# Patient Record
Sex: FEMALE | Race: OTHER | Employment: UNEMPLOYED | URBAN - METROPOLITAN AREA
[De-identification: names, ages, dates, MRNs, and addresses within clinical notes are randomized per-mention and may not be internally consistent; named-entity substitution may affect disease eponyms.]

---
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
## 2020-01-12 ENCOUNTER — APPOINTMENT (OUTPATIENT)
Dept: ULTRASOUND IMAGING | Age: 20
End: 2020-01-12
Attending: NURSE PRACTITIONER
Payer: SELF-PAY

## 2020-01-12 ENCOUNTER — HOSPITAL ENCOUNTER (EMERGENCY)
Age: 20
Discharge: HOME | End: 2020-01-12
Attending: EMERGENCY MEDICINE
Payer: SELF-PAY

## 2020-01-12 VITALS
OXYGEN SATURATION: 99 % | SYSTOLIC BLOOD PRESSURE: 119 MMHG | RESPIRATION RATE: 19 BRPM | TEMPERATURE: 98 F | WEIGHT: 150 LBS | HEIGHT: 60 IN | DIASTOLIC BLOOD PRESSURE: 82 MMHG | BODY MASS INDEX: 29.45 KG/M2 | HEART RATE: 88 BPM

## 2020-01-12 DIAGNOSIS — N39.0 URINARY TRACT INFECTION WITH HEMATURIA, SITE UNSPECIFIED: Primary | ICD-10-CM

## 2020-01-12 DIAGNOSIS — R31.9 URINARY TRACT INFECTION WITH HEMATURIA, SITE UNSPECIFIED: Primary | ICD-10-CM

## 2020-01-12 DIAGNOSIS — Z32.01 PREGNANCY TEST PERFORMED, PREGNANCY CONFIRMED: ICD-10-CM

## 2020-01-12 LAB
ALBUMIN SERPL-MCNC: 3.3 G/DL (ref 3.5–5)
ALBUMIN/GLOB SERPL: 0.8 {RATIO} (ref 1.1–2.2)
ALP SERPL-CCNC: 58 U/L (ref 45–117)
ALT SERPL-CCNC: 13 U/L (ref 12–78)
ANION GAP SERPL CALC-SCNC: 4 MMOL/L (ref 5–15)
APPEARANCE UR: ABNORMAL
AST SERPL-CCNC: 10 U/L (ref 15–37)
BACTERIA URNS QL MICRO: ABNORMAL /HPF
BASOPHILS # BLD: 0 K/UL (ref 0–0.1)
BASOPHILS NFR BLD: 0 % (ref 0–1)
BILIRUB SERPL-MCNC: 0.4 MG/DL (ref 0.2–1)
BILIRUB UR QL: NEGATIVE
BUN SERPL-MCNC: 5 MG/DL (ref 6–20)
BUN/CREAT SERPL: 10 (ref 12–20)
CALCIUM SERPL-MCNC: 8.8 MG/DL (ref 8.5–10.1)
CHLORIDE SERPL-SCNC: 110 MMOL/L (ref 97–108)
CO2 SERPL-SCNC: 25 MMOL/L (ref 21–32)
COLOR UR: ABNORMAL
COMMENT, HOLDF: NORMAL
CREAT SERPL-MCNC: 0.5 MG/DL (ref 0.55–1.02)
DIFFERENTIAL METHOD BLD: NORMAL
EOSINOPHIL # BLD: 0.1 K/UL (ref 0–0.4)
EOSINOPHIL NFR BLD: 1 % (ref 0–7)
EPITH CASTS URNS QL MICRO: ABNORMAL /LPF
ERYTHROCYTE [DISTWIDTH] IN BLOOD BY AUTOMATED COUNT: 12.5 % (ref 11.5–14.5)
GLOBULIN SER CALC-MCNC: 4.4 G/DL (ref 2–4)
GLUCOSE SERPL-MCNC: 80 MG/DL (ref 65–100)
GLUCOSE UR STRIP.AUTO-MCNC: NEGATIVE MG/DL
HCG SERPL-ACNC: ABNORMAL MIU/ML (ref 0–6)
HCT VFR BLD AUTO: 37.8 % (ref 35–47)
HGB BLD-MCNC: 12.8 G/DL (ref 11.5–16)
HGB UR QL STRIP: ABNORMAL
IMM GRANULOCYTES # BLD AUTO: 0 K/UL (ref 0–0.04)
IMM GRANULOCYTES NFR BLD AUTO: 0 % (ref 0–0.5)
KETONES UR QL STRIP.AUTO: 40 MG/DL
LEUKOCYTE ESTERASE UR QL STRIP.AUTO: ABNORMAL
LIPASE SERPL-CCNC: 109 U/L (ref 73–393)
LYMPHOCYTES # BLD: 2 K/UL (ref 0.8–3.5)
LYMPHOCYTES NFR BLD: 22 % (ref 12–49)
MCH RBC QN AUTO: 30 PG (ref 26–34)
MCHC RBC AUTO-ENTMCNC: 33.9 G/DL (ref 30–36.5)
MCV RBC AUTO: 88.5 FL (ref 80–99)
MONOCYTES # BLD: 0.5 K/UL (ref 0–1)
MONOCYTES NFR BLD: 5 % (ref 5–13)
NEUTS SEG # BLD: 6.7 K/UL (ref 1.8–8)
NEUTS SEG NFR BLD: 72 % (ref 32–75)
NITRITE UR QL STRIP.AUTO: NEGATIVE
NRBC # BLD: 0 K/UL (ref 0–0.01)
NRBC BLD-RTO: 0 PER 100 WBC
PH UR STRIP: 6 [PH] (ref 5–8)
PLATELET # BLD AUTO: 209 K/UL (ref 150–400)
PMV BLD AUTO: 11.3 FL (ref 8.9–12.9)
POTASSIUM SERPL-SCNC: 3.6 MMOL/L (ref 3.5–5.1)
PROT SERPL-MCNC: 7.7 G/DL (ref 6.4–8.2)
PROT UR STRIP-MCNC: NEGATIVE MG/DL
RBC # BLD AUTO: 4.27 M/UL (ref 3.8–5.2)
RBC #/AREA URNS HPF: ABNORMAL /HPF (ref 0–5)
SAMPLES BEING HELD,HOLD: NORMAL
SODIUM SERPL-SCNC: 139 MMOL/L (ref 136–145)
SP GR UR REFRACTOMETRY: 1.02 (ref 1–1.03)
UR CULT HOLD, URHOLD: NORMAL
UROBILINOGEN UR QL STRIP.AUTO: 0.2 EU/DL (ref 0.2–1)
WBC # BLD AUTO: 9.4 K/UL (ref 3.6–11)
WBC URNS QL MICRO: ABNORMAL /HPF (ref 0–4)

## 2020-01-12 PROCEDURE — 81001 URINALYSIS AUTO W/SCOPE: CPT

## 2020-01-12 PROCEDURE — 76815 OB US LIMITED FETUS(S): CPT

## 2020-01-12 PROCEDURE — 99282 EMERGENCY DEPT VISIT SF MDM: CPT

## 2020-01-12 PROCEDURE — 86900 BLOOD TYPING SEROLOGIC ABO: CPT

## 2020-01-12 PROCEDURE — 84702 CHORIONIC GONADOTROPIN TEST: CPT

## 2020-01-12 PROCEDURE — 85025 COMPLETE CBC W/AUTO DIFF WBC: CPT

## 2020-01-12 PROCEDURE — 36415 COLL VENOUS BLD VENIPUNCTURE: CPT

## 2020-01-12 PROCEDURE — 83690 ASSAY OF LIPASE: CPT

## 2020-01-12 PROCEDURE — 80053 COMPREHEN METABOLIC PANEL: CPT

## 2020-01-12 RX ORDER — CEPHALEXIN 500 MG/1
500 CAPSULE ORAL 4 TIMES DAILY
Qty: 28 CAP | Refills: 0 | Status: SHIPPED | OUTPATIENT
Start: 2020-01-12 | End: 2020-01-19

## 2020-01-12 NOTE — ED TRIAGE NOTES
Patient reports being 12 week appt states she will see OB on 1/21. Patient reports strong cramps at night with thick yellow discharge.

## 2020-01-13 LAB
ABO + RH BLD: NORMAL
BLOOD BANK CMNT PATIENT-IMP: NORMAL

## 2020-01-13 NOTE — DISCHARGE INSTRUCTIONS
Patient Education        Urinary Tract Infection in Pregnancy: Care Instructions  Your Care Instructions    A urinary tract infection, or UTI, is an infection of the bladder and other urinary structures. Most UTIs occur in the bladder. They often cause pain or burning when you urinate. UTI is the most common bacterial infection in pregnancy. If untreated, a UTI could lead to problems such as a kidney infection or  labor. Most UTIs can be cured with antibiotics. Your doctor will prescribe an antibiotic that is safe during pregnancy. Be sure to finish your medicine so that the infection does not spread to your kidneys. Follow-up care is a key part of your treatment and safety. Be sure to make and go to all appointments, and call your doctor if you are having problems. It's also a good idea to know your test results and keep a list of the medicines you take. How can you care for yourself at home? · Take your antibiotics as directed. Do not stop taking them just because you feel better. You need to take the full course of antibiotics. · Drink extra water and other fluids for the next day or two. This will help wash out the bacteria causing the infection. If you have kidney, heart, or liver disease and have to limit fluids, talk with your doctor before you increase the amount of fluids you drink. · Do not drink alcohol. · Urinate often. Try to empty your bladder each time. Preventing UTIs  · Drink plenty of fluids, enough so that your urine is light yellow or clear like water. This helps you urinate often, which clears bacteria from your system. If you have kidney, heart, or liver disease and have to limit fluids, talk with your doctor before you increase the amount of fluids you drink. · Urinate when you first have the urge. · Urinate right after you have sex. This is the best way for women to avoid UTIs.   · When going to the bathroom, wipe from front to back to keep bacteria from entering the vagina or urethra. When should you call for help? Call your doctor now or seek immediate medical care if:    · You have symptoms of a worse urinary tract infection. These may include:  ? Pain or burning when you urinate. ? A frequent need to urinate without being able to pass much urine. ? Pain in the flank, which is just below the rib cage and above the waist on either side of the back. ? Blood in your urine. ? A fever.    Watch closely for changes in your health, and be sure to contact your doctor if:    · You do not get better as expected. Where can you learn more? Go to http://choco-rena.info/. Enter M982 in the search box to learn more about \"Urinary Tract Infection in Pregnancy: Care Instructions. \"  Current as of: May 29, 2019  Content Version: 12.2  © 8543-6145 [employer]. Care instructions adapted under license by [employer] (which disclaims liability or warranty for this information). If you have questions about a medical condition or this instruction, always ask your healthcare professional. [address] any warranty or liability for your use of this information.

## 2020-01-22 ENCOUNTER — HOSPITAL ENCOUNTER (OUTPATIENT)
Dept: LAB | Age: 20
Discharge: HOME | End: 2020-01-22

## 2020-01-22 ENCOUNTER — HOSPITAL ENCOUNTER (OUTPATIENT)
Dept: LAB | Age: 20
Discharge: HOME | End: 2020-01-22
Payer: SELF-PAY

## 2020-01-22 ENCOUNTER — INITIAL PRENATAL (OUTPATIENT)
Dept: FAMILY MEDICINE CLINIC | Age: 20
End: 2020-01-22

## 2020-01-22 VITALS
HEIGHT: 60 IN | TEMPERATURE: 97.5 F | WEIGHT: 132.8 LBS | DIASTOLIC BLOOD PRESSURE: 77 MMHG | BODY MASS INDEX: 26.07 KG/M2 | HEART RATE: 99 BPM | OXYGEN SATURATION: 99 % | RESPIRATION RATE: 16 BRPM | SYSTOLIC BLOOD PRESSURE: 117 MMHG

## 2020-01-22 DIAGNOSIS — Z34.80 SUPERVISION OF OTHER NORMAL PREGNANCY: ICD-10-CM

## 2020-01-22 DIAGNOSIS — Z34.80 SUPERVISION OF OTHER NORMAL PREGNANCY: Primary | ICD-10-CM

## 2020-01-22 LAB
BILIRUB UR QL STRIP: NEGATIVE
GLUCOSE UR-MCNC: NEGATIVE MG/DL
KETONES P FAST UR STRIP-MCNC: NEGATIVE MG/DL
PH UR STRIP: 6.5 [PH] (ref 4.6–8)
PROT UR QL STRIP: NORMAL
SP GR UR STRIP: 1.02 (ref 1–1.03)
UA UROBILINOGEN AMB POC: NORMAL (ref 0.2–1)
URINALYSIS CLARITY POC: CLEAR
URINALYSIS COLOR POC: YELLOW
URINE BLOOD POC: NORMAL
URINE LEUKOCYTES POC: NORMAL
URINE NITRITES POC: NEGATIVE

## 2020-01-22 PROCEDURE — 88175 CYTOPATH C/V AUTO FLUID REDO: CPT

## 2020-01-22 RX ORDER — GUAIFENESIN 100 MG/5ML
81 LIQUID (ML) ORAL DAILY
Qty: 90 TAB | Refills: 2 | Status: SHIPPED | OUTPATIENT
Start: 2020-01-22 | End: 2020-06-21

## 2020-01-22 NOTE — PROGRESS NOTES
RETURN OB VISIT     Subjective:   Tamia Thompson is a  who presents at 15w0d for her initial prenatal visit. LMP ~ 10/1/2019. Had US to confirm TWIN pregnancy at [address] on 2019 and JAZMINE 7/15/20. US was 8 days discrepant from LMP dating    She developed fever, headache, body aches and cough x 3-4 days ago, but feeling better now. Her son was + for flu last week with similar sxs. Just lingering cough. Declines tamiflu tx. Was treated for UTI earlier this pregnancy after visiting ER on 20. She has completed the abx. Denies dysuria or other urinary sxs. Still with some occasional nausea. Not bad now, easing off. Declines antiemetics, B6. She is eating a balanced diet. Drinking water. She is taking her prenatal vitamins. She is not yet feeling baby movement. She denies vaginal bleeding, discharge or loss of fluid. She denies nausea, vomiting, severe abdominal pain or cramping. She denies dysuria. Current Outpatient Medications on File Prior to Visit   Medication Sig Dispense Refill    PNV no.24-iron-folic acid-dha (PRENATAL DHA+COMPLETE PRENATAL) -300 mg-mcg-mg cmpk Take 1 Tab by mouth daily.  ibuprofen (MOTRIN) 800 mg tablet Take 1 Tab by mouth every eight (8) hours. 30 Tab 0     No current facility-administered medications on file prior to visit. Objective:     Visit Vitals  /77   Pulse 99   Temp 97.5 °F (36.4 °C) (Oral)   Resp 16   Ht 5' (1.524 m)   Wt 132 lb 12.8 oz (60.2 kg)   SpO2 99%   BMI 25.94 kg/m²     Physical Exam:  GENERAL APPEARANCE: alert, well appearing, in no apparent distress, well hydrated  HEAD: normocephalic, atraumatic  LUNGS: clear to auscultation, no wheezes, rales or rhonchi, symmetric air entry  HEART: regular rate and rhythm, no murmurs  ABDOMEN: soft, nontender, nondistended, no abnormal masses, no epigastric pain  Fundus soft, nontender.  FHT present for Baby A/R (153) and Baby B/L (148)  : Normal appearing vulva and vagina, mod white clumpy vaginal discharge, no cervical changes or discharge, no CMT    Results for orders placed or performed in visit on 20   AMB POC URINALYSIS DIP STICK AUTO W/O MICRO     Status: None   Result Value Ref Range Status    Color (UA POC) Yellow  Final    Clarity (UA POC) Clear  Final    Glucose (UA POC) Negative Negative Final    Bilirubin (UA POC) Negative Negative Final    Ketones (UA POC) Negative Negative Final    Specific gravity (UA POC) 1.025 1.001 - 1.035 Final    Blood (UA POC) Trace Negative Final    pH (UA POC) 6.5 4.6 - 8.0 Final    Protein (UA POC) 1+ Negative Final    Urobilinogen (UA POC) 1 mg/dL 0.2 - 1 Final    Nitrites (UA POC) Negative Negative Final    Leukocyte esterase (UA POC) 1+ Negative Final   Results for orders placed or performed in visit on 17   AMB POC URINALYSIS DIP STICK AUTO W/ MICRO     Status: None   Result Value Ref Range Status    Color (UA POC) Yellow  Final    Clarity (UA POC) Slightly Cloudy  Final    Glucose (UA POC) Negative Negative Final    Bilirubin (UA POC) Negative Negative Final    Ketones (UA POC) 2+ Negative Final    Specific gravity (UA POC) 1.030 1.001 - 1.035 Final    Blood (UA POC) Trace Negative Final    pH (UA POC) 6.0 4.6 - 8.0 Final    Protein (UA POC) Trace Negative mg/dL Final    Urobilinogen (UA POC) normal 0.2 - 1 Final    Nitrites (UA POC) Negative Negative Final    Leukocyte esterase (UA POC) Trace Negative Final       Assessment/ Plan:   Davi Israel is a 22 y.o.  at 15w0d by LMP c/w 6w6d US with Estimated Date of Delivery: 7/15/20. Complicated by: Di/Di Twin IUP  Blood type O POSITIVE  PNLs ordered today, lab closed so pt will return for these  First Pap smear collected today  Information re: genetic screening given, pt to discuss with FOB  Request for MFM anatomy scan faxed today  H/o UTI s/p tx. Repeat UCx today, no dysuria  Pt believes she recently had influenza.  She declines flu vaccine today, may accept at subsequent visit. Declines tamiflu tx as she is feeling better. 1. Di/di Twim IUP: Both babies visualized on bedside US today, + cardiac activity and normal fetal movement. Start ASA 81mg daily for preE ppx. Diagnoses and all orders for this visit:    1. Supervision of other normal pregnancy  -     AMB POC URINALYSIS DIP STICK AUTO W/O MICRO  -     BLOOD TYPE, (ABO+RH); Future  -     ANTIBODY SCREEN; Future  -     HEP B SURFACE AG; Future  -     HEMOGLOBIN FRACTIONATION; Future  -     HIV 1/2 AG/AB, 4TH GENERATION,W RFLX CONFIRM; Future  -     RUBELLA AB, IGG; Future  -     VZV AB, IGG; Future  -     CHLAMYDIA/GC PCR; Future  -     CBC WITH AUTOMATED DIFF; Future  -     CULTURE, URINE; Future  -     PAP IG, RFX APTIMA HPV ASCUS ([id number])); Future  -     T PALLIDUM AB, BY FTA-ABS; Future  -     AMB POC SMEAR, STAIN & INTERPRET, WET MOUNT    Other orders  -     prenatal vit-calcium-iron-fa (PRENATAL PLUS WITH CALCIUM) 27 mg iron- 1 mg tab; Take 1 Tab by mouth daily. -     aspirin 81 mg chewable tablet; Take 1 Tab by mouth daily. For preeclampsia prevention         Follow-up and Dispositions    · Return in about 4 weeks (around 2/19/2020) for Routine PNC. I have discussed the diagnosis with the patient and the intended plan as seen in the above orders. Medication Side Effects and Warnings were discussed with patient: yes  Patient Labs were reviewed: yes  Patient Past Records were reviewed:  yes    AVS was printed, given to patient and briefly discussed prior to patient's departure from the office today.     Susanne Pemberton MD  [address]   [address] [facility] Dionisio Leone  [phone]

## 2020-01-22 NOTE — PROGRESS NOTES
Chief Complaint   Patient presents with    Initial Prenatal Visit     Blood pressure 117/77, pulse 99, temperature 97.5 °F (36.4 °C), temperature source Oral, resp. rate 16, height 5' (1.524 m), weight 132 lb 12.8 oz (60.2 kg), SpO2 99 %, unknown if currently breastfeeding. 1. Have you been to the ER, urgent care clinic since your last visit? Hospitalized since your last visit? No    2. Have you seen or consulted any other health care providers outside of the [address] [place] [address] since your last visit? Include any pap smears or colon screening. No      Patient denies any bleeding,cramping or leakage of fluid. + fetal movement.

## 2020-01-22 NOTE — PATIENT INSTRUCTIONS
Weeks 14 to 18 of Your Pregnancy: Care Instructions  Your Care Instructions    During this time, you may start to \"show,\" so that you look pregnant to people around you. You may also notice some changes in your skin, such as itchy spots on your palms or acne on your face. Your baby is now able to pass urine, and your baby's first stool (meconium) is starting to collect in his or her intestines. Hair is also beginning to grow on your baby's head. At your next visit, between weeks 18 and 20, your doctor may do an ultrasound test. The test allows your doctor to check for certain problems. Your doctor can also tell the sex of your baby. This is a good time to think about whether you want to know whether your baby is a boy or a girl. Talk to your doctor about getting a flu shot to help keep you healthy during your pregnancy. As your pregnancy moves along, it is common to worry or feel anxious. Your body is changing a lot. And you are thinking about giving birth, the health of your baby, and becoming a parent. You can learn to cope with any anxiety and stress you feel. Follow-up care is a key part of your treatment and safety. Be sure to make and go to all appointments, and call your doctor if you are having problems. It's also a good idea to know your test results and keep a list of the medicines you take. How can you care for yourself at home?   Reduce stress    · Ask for help with cooking and housekeeping.     · Figure out who or what causes your stress. Avoid these people or situations as much as possible.     · Relax every day. Taking 10- to 15-minute breaks can make a big difference. Take a walk, listen to music, or take a warm bath.     · Learn relaxation techniques at prenatal or yoga class. Or buy a relaxation tape.     · List your fears about having a baby and becoming a parent. Share the list with someone you trust. Decide which worries are really small, and try to let them go.    Exercise    · If you did not exercise much before pregnancy, start slowly. Walking is best. [employer], and do a little more every day.     · Brisk walking, easy jogging, low-impact aerobics, water aerobics, and yoga are good choices. Some sports, such as scuba diving, horseback riding, downhill skiing, gymnastics, and water skiing, are not a good idea.     · Try to do at least 2½ hours a week of moderate exercise, such as a fast walk. One way to do this is to be active 30 minutes a day, at least 5 days a week. It's fine to be active in blocks of 10 minutes or more throughout your day and week.     · Wear loose clothing. And wear shoes and a bra that provide good support.     · Warm up and cool down to start and finish your exercise.     · If you want to use weights, be sure to use light weights. They reduce stress on your joints.    Stay at the best weight for you    · Experts recommend that you gain about 1 pound a month during the first 3 months of your pregnancy.     · Experts recommend that you gain about 1 pound a week during your last 6 months of pregnancy, for a total weight gain of 25 to 35 pounds.     · If you are underweight, you will need to gain more weight (about 28 to 40 pounds).     · If you are overweight, you may not need to gain as much weight (about 15 to 25 pounds).     · If you are gaining weight too fast, use common sense. Exercise every day, and limit sweets, fast foods, and fats. Choose lean meats, fruits, and vegetables.     · If you are having twins or more, your doctor may refer you to a dietitian. Where can you learn more? Go to http://choco-rena.info/. Enter A608 in the search box to learn more about \"Weeks 14 to 18 of Your Pregnancy: Care Instructions. \"  Current as of: May 29, 2019  Content Version: 12.2  © 0409-3266 [employer]. Care instructions adapted under license by [employer] (which disclaims liability or warranty for this information).  If you have questions about a medical condition or this instruction, always ask your healthcare professional. [address] any warranty or liability for your use of this information. Learning About When to Call Your Doctor During Pregnancy (Up to 20 Weeks)  Your Care Instructions    It's common to have concerns about what might be a problem during pregnancy. Although most pregnant women don't have any serious problems, it's important to know when to call your doctor if you have certain symptoms. These are general suggestions. Your doctor may give you some more information about when to call. When to call your doctor (up to 20 weeks)  Call 911 anytime you think you may need emergency care. For example, call if:  · You passed out (lost consciousness). Call your doctor now or seek immediate medical care if:  · You have a fever. · You have vaginal bleeding. · You are dizzy or lightheaded, or you feel like you may faint. · You have symptoms of a urinary tract infection. These may include:  ? Pain or burning when you urinate. ? A frequent need to urinate without being able to pass much urine. ? Pain in the flank, which is just below the rib cage and above the waist on either side of the back. ? Blood in your urine. · You have belly pain. · You think you are having contractions. · You have a sudden release of fluid from your vagina. Watch closely for changes in your health, and be sure to contact your doctor if:  · You have vaginal discharge that smells bad. · You have other concerns about your pregnancy. Follow-up care is a key part of your treatment and safety. Be sure to make and go to all appointments, and call your doctor if you are having problems. It's also a good idea to know your test results and keep a list of the medicines you take. Where can you learn more? Go to http://choco-rena.info/.   Enter C591 in the search box to learn more about \"Learning About When to Call Your Doctor During Pregnancy (Up to 20 Weeks). \"  Current as of: May 29, 2019  Content Version: 12.2  © 5564-9540 [employer]. Care instructions adapted under license by [employer] (which disclaims liability or warranty for this information). If you have questions about a medical condition or this instruction, always ask your healthcare professional. [address] any warranty or liability for your use of this information.

## 2020-01-24 ENCOUNTER — TELEPHONE (OUTPATIENT)
Dept: FAMILY MEDICINE CLINIC | Age: 20
End: 2020-01-24

## 2020-01-24 LAB
BACTERIA SPEC CULT: NORMAL
C TRACH DNA SPEC QL NAA+PROBE: NEGATIVE
N GONORRHOEA DNA SPEC QL NAA+PROBE: NEGATIVE
SAMPLE TYPE: NORMAL
SERVICE CMNT-IMP: NORMAL
SERVICE CMNT-IMP: NORMAL
SPECIMEN SOURCE: NORMAL
WET MOUNT POCT, WMPOCT: NORMAL

## 2020-01-24 NOTE — TELEPHONE ENCOUNTER
Attempted to call patient to notify of lab results per Dr. Katy Garvin. Left voicemail for patient to return call.

## 2020-01-24 NOTE — PROGRESS NOTES
I attempted to reach pt - no answer at only # listed, left general vm asking pt to call us back to discuss results  Routing to nurse to also try to reach pt and relay results/recs below:  - Pap smear normal cytology, next due in 3 years  - GC/Chlam neg  - UCx neg for UTI  - Wet mount/KOH did show scant budding yeast, no clue cells or trich.   ** If she is having vaginal discomfort/itching then would tx with clotrimazole vaginal suppositories - nurse to let me know if pt would like and I can send in rx, thank you

## 2020-02-18 ENCOUNTER — HOSPITAL ENCOUNTER (OUTPATIENT)
Dept: LAB | Age: 20
Discharge: HOME | End: 2020-02-18

## 2020-02-18 ENCOUNTER — ROUTINE PRENATAL (OUTPATIENT)
Dept: FAMILY MEDICINE CLINIC | Age: 20
End: 2020-02-18

## 2020-02-18 VITALS
RESPIRATION RATE: 16 BRPM | HEIGHT: 60 IN | SYSTOLIC BLOOD PRESSURE: 116 MMHG | BODY MASS INDEX: 27.09 KG/M2 | OXYGEN SATURATION: 98 % | TEMPERATURE: 98.2 F | HEART RATE: 99 BPM | DIASTOLIC BLOOD PRESSURE: 79 MMHG | WEIGHT: 138 LBS

## 2020-02-18 DIAGNOSIS — Z34.80 SUPERVISION OF OTHER NORMAL PREGNANCY: ICD-10-CM

## 2020-02-18 DIAGNOSIS — O30.042 DICHORIONIC DIAMNIOTIC TWIN PREGNANCY IN SECOND TRIMESTER: ICD-10-CM

## 2020-02-18 DIAGNOSIS — O09.92 PREGNANCY, SUPERVISION, HIGH-RISK, SECOND TRIMESTER: ICD-10-CM

## 2020-02-18 DIAGNOSIS — O09.92 PREGNANCY, SUPERVISION, HIGH-RISK, SECOND TRIMESTER: Primary | ICD-10-CM

## 2020-02-18 LAB
ANTIBODY SCREEN, EXTERNAL: NEGATIVE
BILIRUB UR QL STRIP: NEGATIVE
CHLAMYDIA, EXTERNAL: NEGATIVE
GLUCOSE UR-MCNC: NEGATIVE MG/DL
HBSAG, EXTERNAL: NEGATIVE
HIV, EXTERNAL: NEGATIVE
KETONES P FAST UR STRIP-MCNC: NORMAL MG/DL
N. GONORRHEA, EXTERNAL: NEGATIVE
PH UR STRIP: 7 [PH] (ref 4.6–8)
PROT UR QL STRIP: NORMAL
RUBELLA, EXTERNAL: NORMAL
SP GR UR STRIP: 1.02 (ref 1–1.03)
T. PALLIDUM, EXTERNAL: NORMAL
TYPE, ABO & RH, EXTERNAL: NORMAL
UA UROBILINOGEN AMB POC: NORMAL (ref 0.2–1)
URINALYSIS CLARITY POC: NORMAL
URINALYSIS COLOR POC: YELLOW
URINE BLOOD POC: NORMAL
URINE LEUKOCYTES POC: NORMAL
URINE NITRITES POC: NEGATIVE

## 2020-02-18 NOTE — PROGRESS NOTES
Chief Complaint   Patient presents with    Routine Prenatal Visit     18w6d    Leakage of Fluid: NO  Vaginal Bleeding: NO  Fetal Movement: YES  Prenatal vitamins: YES  Having Contractions: NO  Pain: NO    Visit Vitals  /79 (BP 1 Location: Left arm, BP Patient Position: Sitting)   Pulse 99   Temp 98.2 °F (36.8 °C) (Oral)   Resp 16   Ht 5' (1.524 m)   Wt 138 lb (62.6 kg)   SpO2 98%   BMI 26.95 kg/m²

## 2020-02-18 NOTE — PROGRESS NOTES
RETURN OB VISIT     Subjective:   Trista Perez is a  who presents at 18w6d for routine prenatal visit. She denies complaints    She is taking her prenatal vitamins. Has not yet started aspirin, wanted to talk to me again today. She is feeling her babies move a little  She denies vaginal bleeding, discharge or loss of fluid. She denies nausea, vomiting, severe abdominal pain or cramping. She denies dysuria. Current Outpatient Medications on File Prior to Visit   Medication Sig Dispense Refill    prenatal vit-calcium-iron-fa (PRENATAL PLUS WITH CALCIUM) 27 mg iron- 1 mg tab Take 1 Tab by mouth daily. 90 Tab 2    aspirin 81 mg chewable tablet Take 1 Tab by mouth daily. For preeclampsia prevention 90 Tab 2    PNV no.24-iron-folic acid-dha (PRENATAL DHA+COMPLETE PRENATAL) -300 mg-mcg-mg cmpk Take 1 Tab by mouth daily. No current facility-administered medications on file prior to visit. Objective:     Visit Vitals  /79 (BP 1 Location: Left arm, BP Patient Position: Sitting)   Pulse 99   Temp 98.2 °F (36.8 °C) (Oral)   Resp 16   Ht 5' (1.524 m)   Wt 138 lb (62.6 kg)   SpO2 98%   BMI 26.95 kg/m²     Physical Exam:  GENERAL APPEARANCE: alert, well appearing, in no apparent distress, well hydrated  HEAD: normocephalic, atraumatic  Lungs:  No respiratory distress   Fundus soft, nontender.  FHT present (A/R: 150, B/L: 141)    Results for orders placed or performed in visit on 20   AMB POC URINALYSIS DIP STICK AUTO W/O MICRO     Status: None   Result Value Ref Range Status    Color (UA POC) Yellow  Final    Clarity (UA POC) Slightly Cloudy  Final    Glucose (UA POC) Negative Negative Final    Bilirubin (UA POC) Negative Negative Final    Ketones (UA POC) 1+ Negative Final    Specific gravity (UA POC) 1.020 1.001 - 1.035 Final    Blood (UA POC) Trace Negative Final    pH (UA POC) 7.0 4.6 - 8.0 Final    Protein (UA POC) Trace Negative Final    Urobilinogen (UA POC) 1 mg/dL 0.2 - 1 Final    Nitrites (UA POC) Negative Negative Final    Leukocyte esterase (UA POC) 2+ Negative Final   Results for orders placed or performed in visit on 17   AMB POC URINALYSIS DIP STICK AUTO W/ MICRO     Status: None   Result Value Ref Range Status    Color (UA POC) Yellow  Final    Clarity (UA POC) Slightly Cloudy  Final    Glucose (UA POC) Negative Negative Final    Bilirubin (UA POC) Negative Negative Final    Ketones (UA POC) 2+ Negative Final    Specific gravity (UA POC) 1.030 1.001 - 1.035 Final    Blood (UA POC) Trace Negative Final    pH (UA POC) 6.0 4.6 - 8.0 Final    Protein (UA POC) Trace Negative mg/dL Final    Urobilinogen (UA POC) normal 0.2 - 1 Final    Nitrites (UA POC) Negative Negative Final    Leukocyte esterase (UA POC) Trace Negative Final     Assessment/ Plan:   Al Cesar is a 22 y.o.  at 18w6d by 6w6d US with Estimated Date of Delivery: 7/15/20. Complicated by: Kinjal Twins  Blood type O POSITIVE   PNLs collected today  Pap NILM (2020), due in 3 years  MFM anatomy scan scheduled for 20, pt aware of appt   Still considering genetic screening, discussed again today and she prefers to defer decision until after US  Gc/Chlam neg  UA today abnl, recommended she drink more water. Sending UCx  Recommend she get flu vaccine at pharmacy    Weight Gain: Pre-pregnancy weight is in normal (BMI 24) category  Pt is on her anticipated weight gain curve. Has gained 13 lbs thus far, 6 lbs in the last 4 weeks. 1. Kinjal Twin pregnancy: Has not started ASA 81mg daily, has the bottle at home. I counseled her on reasons for taking ASA for mother and infant, she agrees to start taking. Diagnoses and all orders for this visit:    1. Pregnancy, supervision, high-risk, second trimester  -     AMB POC URINALYSIS DIP STICK AUTO W/O MICRO  -     CULTURE, URINE; Future    2.  Dichorionic diamniotic twin pregnancy in second trimester         I have discussed the diagnosis with the patient and the intended plan as seen in the above orders. Medication Side Effects and Warnings were discussed with patient: yes  Patient Labs were reviewed: yes  Patient Past Records were reviewed:  yes    AVS was printed, given to patient and briefly discussed prior to patient's departure from the office today.     Adan Brock MD  [address] [place], [place] [address]srinivasa De Sulema  [phone]

## 2020-02-19 LAB
ABO + RH BLD: NORMAL
BASOPHILS # BLD: 0 K/UL (ref 0–0.1)
BASOPHILS NFR BLD: 0 % (ref 0–1)
BLOOD BANK CMNT PATIENT-IMP: NORMAL
BLOOD GROUP ANTIBODIES SERPL: NORMAL
DIFFERENTIAL METHOD BLD: ABNORMAL
EOSINOPHIL # BLD: 0.2 K/UL (ref 0–0.4)
EOSINOPHIL NFR BLD: 2 % (ref 0–7)
ERYTHROCYTE [DISTWIDTH] IN BLOOD BY AUTOMATED COUNT: 12.9 % (ref 11.5–14.5)
HBV SURFACE AG SER QL: <0.1 INDEX
HBV SURFACE AG SER QL: NEGATIVE
HCT VFR BLD AUTO: 35.1 % (ref 35–47)
HGB BLD-MCNC: 11.1 G/DL (ref 11.5–16)
HIV 1+2 AB+HIV1 P24 AG SERPL QL IA: NONREACTIVE
HIV12 RESULT COMMENT, HHIVC: NORMAL
IMM GRANULOCYTES # BLD AUTO: 0.1 K/UL (ref 0–0.04)
IMM GRANULOCYTES NFR BLD AUTO: 1 % (ref 0–0.5)
LYMPHOCYTES # BLD: 1.7 K/UL (ref 0.8–3.5)
LYMPHOCYTES NFR BLD: 21 % (ref 12–49)
MCH RBC QN AUTO: 29.3 PG (ref 26–34)
MCHC RBC AUTO-ENTMCNC: 31.6 G/DL (ref 30–36.5)
MCV RBC AUTO: 92.6 FL (ref 80–99)
MONOCYTES # BLD: 0.5 K/UL (ref 0–1)
MONOCYTES NFR BLD: 6 % (ref 5–13)
NEUTS SEG # BLD: 5.7 K/UL (ref 1.8–8)
NEUTS SEG NFR BLD: 70 % (ref 32–75)
NRBC # BLD: 0 K/UL (ref 0–0.01)
NRBC BLD-RTO: 0 PER 100 WBC
PLATELET # BLD AUTO: 214 K/UL (ref 150–400)
PMV BLD AUTO: 12.2 FL (ref 8.9–12.9)
RBC # BLD AUTO: 3.79 M/UL (ref 3.8–5.2)
RUBV IGG SER-IMP: REACTIVE
RUBV IGG SERPL IA-ACNC: 27.4 IU/ML
SPECIMEN EXP DATE BLD: NORMAL
WBC # BLD AUTO: 8.1 K/UL (ref 3.6–11)

## 2020-02-20 LAB
BACTERIA SPEC CULT: NORMAL
SERVICE CMNT-IMP: NORMAL
T PALLIDUM AB SER QL IF: NON REACTIVE
VZV IGG SER IA-ACNC: 1129 INDEX

## 2020-02-21 LAB
DEPRECATED HGB OTHER BLD-IMP: 0 %
HGB A MFR BLD: 97.6 % (ref 96.4–98.8)
HGB A2 MFR BLD COLUMN CHROM: 2.4 % (ref 1.8–3.2)
HGB C MFR BLD: 0 %
HGB F MFR BLD: 0 % (ref 0–2)
HGB FRACT BLD-IMP: NORMAL
HGB S BLD QL SOLY: NEGATIVE
HGB S MFR BLD: 0 %

## 2020-02-21 RX ORDER — FERROUS SULFATE 325(65) MG
325 TABLET, DELAYED RELEASE (ENTERIC COATED) ORAL DAILY
Qty: 90 TAB | Refills: 2 | Status: SHIPPED | OUTPATIENT
Start: 2020-02-21 | End: 2020-04-26 | Stop reason: SDUPTHER

## 2020-02-21 NOTE — PROGRESS NOTES
Prenatal labs:  Blood type: O+, ab screen neg, hgb fractionation pending  HIV nr, Hep B neg, T pallidium nr. Gc/Chlam neg/neg  VZV and rubella immune  Mild anemia, Hgb 11.1. Recommend ferrous sulfate 325mg once daily at alternate time of day from PNV to maximize absorption of iron in each.  Routing to nurse to inform pt, thank you

## 2020-02-24 ENCOUNTER — TELEPHONE (OUTPATIENT)
Dept: FAMILY MEDICINE CLINIC | Age: 20
End: 2020-02-24

## 2020-02-24 NOTE — TELEPHONE ENCOUNTER
Called patient and family member answered phone. Asked family member to ask her to give us a call back.

## 2020-02-24 NOTE — TELEPHONE ENCOUNTER
----- Message from Diego Ibanez MD sent at 2/21/2020 10:43 AM EST -----  Prenatal labs:  Blood type: O+, ab screen neg, hgb fractionation pending  HIV nr, Hep B neg, T pallidium nr. Gc/Chlam neg/neg  VZV and rubella immune  Mild anemia, Hgb 11.1. Recommend ferrous sulfate 325mg once daily at alternate time of day from PNV to maximize absorption of iron in each.  Routing to nurse to inform pt, thank you

## 2020-02-26 NOTE — TELEPHONE ENCOUNTER
Patient hung up while holding for nurse to relay her results.     Nurse Juana Frias aware and will try to reach back out to patient

## 2020-02-27 ENCOUNTER — HOSPITAL ENCOUNTER (OUTPATIENT)
Dept: PERINATAL CARE | Age: 20
Discharge: HOME | End: 2020-02-27
Attending: OBSTETRICS & GYNECOLOGY
Payer: SELF-PAY

## 2020-02-27 PROCEDURE — 76805 OB US >/= 14 WKS SNGL FETUS: CPT | Performed by: OBSTETRICS & GYNECOLOGY

## 2020-02-27 PROCEDURE — 76810 OB US >/= 14 WKS ADDL FETUS: CPT | Performed by: OBSTETRICS & GYNECOLOGY

## 2020-03-18 ENCOUNTER — TELEPHONE (OUTPATIENT)
Dept: FAMILY MEDICINE CLINIC | Age: 20
End: 2020-03-18

## 2020-03-18 NOTE — TELEPHONE ENCOUNTER
----- Message from Lucie Summers sent at 3/18/2020 10:57 AM EDT -----  Regarding: Dr Vale/telephone  Contact: [phone]  General Message/Vendor Calls    Caller's first and last name:Nori Herrera Chet      Reason for call:to r/s the OB appt scheduled for today at 2:00 pm      Callback required yes/no and why:yes      Best contact number(s):[phone]      Details to clarify the request:      Lucie Summers

## 2020-03-30 ENCOUNTER — HOSPITAL ENCOUNTER (OUTPATIENT)
Dept: PERINATAL CARE | Age: 20
Discharge: HOME | End: 2020-03-30
Attending: OBSTETRICS & GYNECOLOGY
Payer: SELF-PAY

## 2020-03-30 PROCEDURE — 76816 OB US FOLLOW-UP PER FETUS: CPT | Performed by: OBSTETRICS & GYNECOLOGY

## 2020-04-13 ENCOUNTER — TELEPHONE (OUTPATIENT)
Dept: FAMILY MEDICINE CLINIC | Age: 20
End: 2020-04-13

## 2020-04-13 NOTE — TELEPHONE ENCOUNTER
----- Message from Maye Auguste, DO sent at 4/13/2020  4:26 PM EDT -----  Tangela Fuentes,   Please call this patient and ask if she is getting prenatal care elsewhere. If not she needs to come in asap on a Monday on Wednesday for an appointment.

## 2020-04-17 ENCOUNTER — VIRTUAL VISIT (OUTPATIENT)
Dept: FAMILY MEDICINE CLINIC | Age: 20
End: 2020-04-17

## 2020-04-17 DIAGNOSIS — O30.042 DICHORIONIC DIAMNIOTIC TWIN PREGNANCY IN SECOND TRIMESTER: ICD-10-CM

## 2020-04-17 DIAGNOSIS — O09.92 PREGNANCY, SUPERVISION, HIGH-RISK, SECOND TRIMESTER: Primary | ICD-10-CM

## 2020-04-17 NOTE — PROGRESS NOTES
Edgardo Gloria  19 y.o. female  2000  [address] [address] (home)      [address]:    Telephone Encounter  Benjie Montejo MD       Encounter Date: 2020 at 2:20PM  Consent:  She and/or the health care decision maker is aware that that she may receive a bill for this telephone service, depending on her insurance coverage, and has provided verbal consent to proceed: Yes    Chief Complaint   Patient presents with    Routine Prenatal Visit     29w0d    Leg Problem     right       History of Present Illness   Edgardo Gloria is a 22 y.o. female was evaluated by telephone. I communicated with the patient and/or health care decision maker about:    RETURN OB VISIT     Subjective:   Edgardo Gloria is a  who presents at 29w0d for routine prenatal visit. She c/o R leg numbness that waxes and wanes, seems worse with sitting and better with walking/standing. She denies trauma. She denies pain, redness, swelling of the leg. No back pain, urinary or bowel incontinence. No leg weakness. She is taking her prenatal vitamins and ASA 81mg daily. She never started iron therapy after 2020 labs. She is feeling her babies move. She denies vaginal bleeding, discharge or loss of fluid. She denies nausea, vomiting, severe abdominal pain or cramping. She denies dysuria. She denies excessive swelling of extremities. + dizziness, fatigue, some SOB. Denies fever, cough       Vitals/Objective:   General: Patient speaking in complete sentences without effort. Normal speech and cooperative. Due to this being a Virtual Check-in/Telephone evaluation, many elements of the physical examination are unable to be assessed. Assessment and Plan:   Edgardo Gloria is a 22 y.o.  at 29w0d by [address] with Estimated Date of Delivery: 7/3/20.   Complicated by: Kinjal Twin gestation, mild anemia  Blood type O+, ab screen neg   PNLs wnl aside from mild anemia  Pap NILM (1/2020), due in 3 years  MFM anatomy scan scheduled for 2/27/20, pt aware of appt   Declined genetic screening, confirmed with pt today  Needs 1hr GTT and CBC, ordered and office to call pt to make lab appt next week    1. Kinjal Twin pregnancy: Followed by MFM for monthly growth scans, inter-twin discordance 4% wnl. Continue PNV and ASA 81mg daily. 2. Mild anemia: never started iron therapy, last Hgb 11.1. Will check 3rd trimester CBC and initiate iron if worsening anemia  3. Confusing dating: Pt unsure of LMP per our discussion today and previous notes. Had 2 early sonos at 6wks and 15wks GA, using the latter for dating with JAZMINE 7/3/2020    1. Pregnancy, supervision, high-risk, second trimester  - GLUCOSE, GESTATIONAL 1 HR TOLERANCE; Future  - CBC WITH AUTOMATED DIFF; Future    2. Dichorionic diamniotic twin pregnancy in second trimester      We discussed the expected course, resolution and complications of the diagnosis(es) in detail. Medication risks, benefits, costs, interactions, and alternatives were discussed as indicated. I advised her to contact the office if her condition worsens, changes or fails to improve as anticipated. She expressed understanding with the diagnosis(es) and plan. Patient understands that this encounter was a temporary measure, and the importance of further follow up and examination was emphasized. Patient verbalized understanding. Patient informed to follow up:   - lab appt ASAP for 1hr GTT and CBC  - keep M appts as scheduled, reviewed date/time of upcoming appt on 4/27 at 11am with pt  - make phone follow up with us in 3 weeks  - needs office appt at 36 weeks GA for GBS testing    I affirm this is a Patient Initiated Episode with an Established Patient who has not had a related appointment within my department in the past 7 days or scheduled within the next 24 hours.   Note: not billable if this call serves to triage the patient into an appointment for the relevant concern    Time spent on phone with patient: 25 minutes      Electronically Signed: Tobi Franco MD  Providers location when delivering service: home    CPT:  00487 (5-10 minutes)  [phone] (11-20 minutes)  21  (21-30 minutes)    Medicare:  [address]      ICD-10-CM ICD-9-CM    1. Pregnancy, supervision, high-risk, second trimester O09.92 V23.9 GLUCOSE, GESTATIONAL 1 HR TOLERANCE      CBC WITH AUTOMATED DIFF   2. Dichorionic diamniotic twin pregnancy in second trimester O30.042 651.03      V91.03        Pursuant to the emergency declaration under the [address] [place] [address] waiver authority and the [employer] Act, this Virtual  Visit was conducted, with patient's consent, to reduce the patient's risk of exposure to COVID-19 and provide continuity of care for an established patient. History   Patients past medical, surgical and family histories were personally reviewed and updated. No past medical history on file. No past surgical history on file. No family history on file.   Social History     Socioeconomic History    Marital status: SINGLE     Spouse name: Not on file    Number of children: Not on file    Years of education: Not on file    Highest education level: Not on file   Occupational History    Not on file   Social Needs    Financial resource strain: Not on file    Food insecurity     Worry: Not on file     Inability: Not on file    Transportation needs     Medical: Not on file     Non-medical: Not on file   Tobacco Use    Smoking status: Never Smoker    Smokeless tobacco: Never Used   Substance and Sexual Activity    Alcohol use: No    Drug use: No    Sexual activity: Yes     Partners: Male     Birth control/protection: None   Lifestyle    Physical activity     Days per week: Not on file     Minutes per session: Not on file    Stress: Not on file Relationships    Social connections     Talks on phone: Not on file     Gets together: Not on file     Attends Taoist service: Not on file     Active member of club or organization: Not on file     Attends meetings of clubs or organizations: Not on file     Relationship status: Not on file    Intimate partner violence     Fear of current or ex partner: Not on file     Emotionally abused: Not on file     Physically abused: Not on file     Forced sexual activity: Not on file   Other Topics Concern    Not on file   Social History Narrative    Not on file            Current Medications/Allergies   Medications and Allergies reviewed:    Current Outpatient Medications   Medication Sig Dispense Refill    ferrous sulfate (IRON) 325 mg (65 mg iron) EC tablet Take 1 Tab by mouth daily. For anemia. [place] 1 tableta por boca diario para anemia 90 Tab 2    prenatal vit-calcium-iron-fa (PRENATAL PLUS WITH CALCIUM) 27 mg iron- 1 mg tab Take 1 Tab by mouth daily. 90 Tab 2    aspirin 81 mg chewable tablet Take 1 Tab by mouth daily. For preeclampsia prevention 90 Tab 2    PNV no.24-iron-folic acid-dha (PRENATAL DHA+COMPLETE PRENATAL) -300 mg-mcg-mg cmpk Take 1 Tab by mouth daily.        No Known Allergies

## 2020-04-24 ENCOUNTER — LAB ONLY (OUTPATIENT)
Dept: FAMILY MEDICINE CLINIC | Age: 20
End: 2020-04-24

## 2020-04-24 ENCOUNTER — HOSPITAL ENCOUNTER (OUTPATIENT)
Dept: LAB | Age: 20
Discharge: HOME | End: 2020-04-24

## 2020-04-24 DIAGNOSIS — O09.92 PREGNANCY, SUPERVISION, HIGH-RISK, SECOND TRIMESTER: ICD-10-CM

## 2020-04-24 LAB
BASOPHILS # BLD: 0 K/UL (ref 0–0.1)
BASOPHILS NFR BLD: 0 % (ref 0–1)
DIFFERENTIAL METHOD BLD: ABNORMAL
EOSINOPHIL # BLD: 0.1 K/UL (ref 0–0.4)
EOSINOPHIL NFR BLD: 1 % (ref 0–7)
ERYTHROCYTE [DISTWIDTH] IN BLOOD BY AUTOMATED COUNT: 14 % (ref 11.5–14.5)
GLUCOSE 1H P 100 G GLC PO SERPL-MCNC: 110 MG/DL (ref 65–140)
HCT VFR BLD AUTO: 33.8 % (ref 35–47)
HGB BLD-MCNC: 10.6 G/DL (ref 11.5–16)
IMM GRANULOCYTES # BLD AUTO: 0.1 K/UL (ref 0–0.04)
IMM GRANULOCYTES NFR BLD AUTO: 1 % (ref 0–0.5)
LYMPHOCYTES # BLD: 1.9 K/UL (ref 0.8–3.5)
LYMPHOCYTES NFR BLD: 20 % (ref 12–49)
MCH RBC QN AUTO: 28.7 PG (ref 26–34)
MCHC RBC AUTO-ENTMCNC: 31.4 G/DL (ref 30–36.5)
MCV RBC AUTO: 91.6 FL (ref 80–99)
MONOCYTES # BLD: 0.7 K/UL (ref 0–1)
MONOCYTES NFR BLD: 7 % (ref 5–13)
NEUTS SEG # BLD: 6.9 K/UL (ref 1.8–8)
NEUTS SEG NFR BLD: 71 % (ref 32–75)
NRBC # BLD: 0 K/UL (ref 0–0.01)
NRBC BLD-RTO: 0 PER 100 WBC
PLATELET # BLD AUTO: 195 K/UL (ref 150–400)
PMV BLD AUTO: 12.1 FL (ref 8.9–12.9)
RBC # BLD AUTO: 3.69 M/UL (ref 3.8–5.2)
WBC # BLD AUTO: 9.8 K/UL (ref 3.6–11)

## 2020-04-26 ENCOUNTER — TELEPHONE (OUTPATIENT)
Dept: FAMILY MEDICINE CLINIC | Age: 20
End: 2020-04-26

## 2020-04-26 RX ORDER — DOCUSATE SODIUM 100 MG/1
100 CAPSULE, LIQUID FILLED ORAL
Qty: 60 CAP | Refills: 2 | Status: SHIPPED | OUTPATIENT
Start: 2020-04-26 | End: 2020-06-15

## 2020-04-26 RX ORDER — FERROUS SULFATE 325(65) MG
325 TABLET, DELAYED RELEASE (ENTERIC COATED) ORAL DAILY
Qty: 90 TAB | Refills: 1 | Status: SHIPPED | OUTPATIENT
Start: 2020-04-26

## 2020-04-26 RX ORDER — DOCUSATE SODIUM 100 MG/1
100 CAPSULE, LIQUID FILLED ORAL DAILY
Status: CANCELLED | OUTPATIENT
Start: 2020-04-27

## 2020-04-26 RX ORDER — LANOLIN ALCOHOL/MO/W.PET/CERES
1 CREAM (GRAM) TOPICAL
Status: CANCELLED | OUTPATIENT
Start: 2020-04-27

## 2020-04-26 NOTE — PROGRESS NOTES
I called pt to relay results and recs  1hr GTT wnl  Hgb 10.6, recommend daily iron tab and PRN colace.  Rxs sent  Has [facility] appt tomorrow   Routing to ob nurse to make [facility] with our office in 2 weeks

## 2020-04-27 ENCOUNTER — HOSPITAL ENCOUNTER (OUTPATIENT)
Dept: PERINATAL CARE | Age: 20
Discharge: HOME | End: 2020-04-27
Attending: OBSTETRICS & GYNECOLOGY
Payer: SELF-PAY

## 2020-04-27 PROCEDURE — 76816 OB US FOLLOW-UP PER FETUS: CPT | Performed by: OBSTETRICS & GYNECOLOGY

## 2020-04-27 NOTE — PROGRESS NOTES
Estimated Date of Delivery: 7/3/20    Hx of    Kinjal twins discordinance EFW 61st 43rd%  F/u for monthly scans

## 2020-05-11 ENCOUNTER — TELEPHONE (OUTPATIENT)
Dept: FAMILY MEDICINE CLINIC | Age: 20
End: 2020-05-11

## 2020-05-11 NOTE — TELEPHONE ENCOUNTER
----- Message from Misti Barksdale sent at 5/11/2020  2:55 PM EDT -----  Regarding: Dr. Gail Caballero: [phone]  Patient would like to reschedule her ob visit for one day this week in the afternoon. Patient's ride didn't show.          Dr. Tatianna Borja, please let patient know that it was rescheduled to Wed. 05/13/2020 at 8:30 AM.

## 2020-05-12 ENCOUNTER — TELEPHONE (OUTPATIENT)
Dept: FAMILY MEDICINE CLINIC | Age: 20
End: 2020-05-12

## 2020-05-12 NOTE — TELEPHONE ENCOUNTER
Earline Marrufo  19 y.o. female  2000  [address] [phone] (home)      [address]:    [facility] Telephone Encounter  Gregory Umana MD       Encounter Date: 5/12/2020 at 11:00 AM    2nd attempt made to contact patient regarding missed prenatal visit.  Unable to leave voice message    Gregory Umana MD  [facility] Medicine Resident  PGY-3

## 2020-05-12 NOTE — TELEPHONE ENCOUNTER
Eliu Solis  19 y.o. female  2000  [address] [facility] [address] [address] [address] (home)      [address]:    [facility] Telephone Encounter  Marito Moore MD       Encounter Date: 5/12/2020 at 2:59 PM      Third attempt made to contact patient regarding missed prenatal visit. Phone call is going straight to voice mail but unable to leave message. Called emergency contact number: Father. He was not with her when he picked up the phone. I informed him that she has an appointment tomorrow at 8:30AM,  said he was gong to relay the message to his daughter.     Marito Moore MD  Family Medicine Resident  PGY-3

## 2020-05-13 ENCOUNTER — HOSPITAL ENCOUNTER (OUTPATIENT)
Dept: LAB | Age: 20
Discharge: HOME | End: 2020-05-13

## 2020-05-13 ENCOUNTER — ROUTINE PRENATAL (OUTPATIENT)
Dept: FAMILY MEDICINE CLINIC | Age: 20
End: 2020-05-13

## 2020-05-13 VITALS
RESPIRATION RATE: 17 BRPM | OXYGEN SATURATION: 100 % | HEIGHT: 60 IN | DIASTOLIC BLOOD PRESSURE: 72 MMHG | BODY MASS INDEX: 31.57 KG/M2 | TEMPERATURE: 98.3 F | WEIGHT: 160.8 LBS | HEART RATE: 92 BPM | SYSTOLIC BLOOD PRESSURE: 108 MMHG

## 2020-05-13 DIAGNOSIS — Z23 ENCOUNTER FOR IMMUNIZATION: ICD-10-CM

## 2020-05-13 DIAGNOSIS — Z34.90 PRENATAL CARE, ANTEPARTUM: ICD-10-CM

## 2020-05-13 DIAGNOSIS — Z34.90 PRENATAL CARE, ANTEPARTUM: Primary | ICD-10-CM

## 2020-05-13 LAB
AMPHET UR QL SCN: NEGATIVE
BARBITURATES UR QL SCN: NEGATIVE
BENZODIAZ UR QL: NEGATIVE
BILIRUB UR QL STRIP: NEGATIVE
CANNABINOIDS UR QL SCN: NEGATIVE
COCAINE UR QL SCN: NEGATIVE
DRUG SCRN COMMENT,DRGCM: NORMAL
GLUCOSE UR-MCNC: NEGATIVE MG/DL
KETONES P FAST UR STRIP-MCNC: NEGATIVE MG/DL
METHADONE UR QL: NEGATIVE
OPIATES UR QL: NEGATIVE
PCP UR QL: NEGATIVE
PH UR STRIP: 7 [PH] (ref 4.6–8)
PROT UR QL STRIP: NEGATIVE
SP GR UR STRIP: 1.01 (ref 1–1.03)
UA UROBILINOGEN AMB POC: NORMAL (ref 0.2–1)
URINALYSIS CLARITY POC: NORMAL
URINALYSIS COLOR POC: YELLOW
URINE BLOOD POC: NORMAL
URINE LEUKOCYTES POC: NORMAL
URINE NITRITES POC: NEGATIVE

## 2020-05-13 NOTE — PROGRESS NOTES
I reviewed with the resident the medical history and the resident's findings on the physical examination. I discussed with the resident the patient's diagnosis and concur with the plan. Estimated Date of Delivery: 7/3/20    20yo  @ 32w5d by 15wk scan   1. Samira twin IUP: EFW 61st and 43rd%, MFM following, GTT WNL, s/p tdap, RH pos, will schedule section for 38 weeks and continue to follow presentation -  at 7:30, pt to arrive at 5:30 fasting and has not been made aware yet. Chart sent to Dr. Shelia Mg. 2.  Noncompliance: spotty care, will get UDS   3.   Anemia: mild on iron

## 2020-05-13 NOTE — PROGRESS NOTES
Chief Complaint   Patient presents with    Routine Prenatal Visit     32w 5d     1. Have you been to the ER, urgent care clinic since your last visit? Hospitalized since your last visit? No    2. Have you seen or consulted any other health care providers outside of the [address] [place] [address] since your last visit? Include any pap smears or colon screening.  No    Patient denies having any bleeding, cramping and spotting or leakage of fluid    Patient states that she feels fetal movement    Nausea--2 days ago

## 2020-05-13 NOTE — PROGRESS NOTES
Return OB Visit       Subjective:   Felipe Guzman 19 y.o.   JAZMINE: 7/3/2020, by Ultrasound  GA:  32w5d. LOF: none  Vaginal bleeding: none  Fetal movement: present  Contractions: none  PNV: taking      Allergies- reviewed:   No Known Allergies  Medications- reviewed:   Current Outpatient Medications   Medication Sig    ferrous sulfate (IRON) 325 mg (65 mg iron) EC tablet Take 1 Tab by mouth daily. For anemia.  prenatal vit-calcium-iron-fa (PRENATAL PLUS WITH CALCIUM) 27 mg iron- 1 mg tab Take 1 Tab by mouth daily.  aspirin 81 mg chewable tablet Take 1 Tab by mouth daily. For preeclampsia prevention    docusate sodium (COLACE) 100 mg capsule Take 1 Cap by mouth two (2) times daily as needed for Constipation.  PNV no.24-iron-folic acid-dha (PRENATAL DHA+COMPLETE PRENATAL) -300 mg-mcg-mg cmpk Take 1 Tab by mouth daily. No current facility-administered medications for this visit. Past Medical History- reviewed:  History reviewed. No pertinent past medical history. Past Surgical History- reviewed:   History reviewed. No pertinent surgical history.   Social History- reviewed:  Social History     Socioeconomic History    Marital status: SINGLE     Spouse name: Not on file    Number of children: Not on file    Years of education: Not on file    Highest education level: Not on file   Occupational History    Not on file   Social Needs    Financial resource strain: Not on file    Food insecurity     Worry: Not on file     Inability: Not on file    Transportation needs     Medical: Not on file     Non-medical: Not on file   Tobacco Use    Smoking status: Never Smoker    Smokeless tobacco: Never Used   Substance and Sexual Activity    Alcohol use: No    Drug use: No    Sexual activity: Yes     Partners: Male     Birth control/protection: None   Lifestyle    Physical activity     Days per week: Not on file     Minutes per session: Not on file    Stress: Not on file Relationships    Social connections     Talks on phone: Not on file     Gets together: Not on file     Attends Zoroastrianism service: Not on file     Active member of club or organization: Not on file     Attends meetings of clubs or organizations: Not on file     Relationship status: Not on file    Intimate partner violence     Fear of current or ex partner: Not on file     Emotionally abused: Not on file     Physically abused: Not on file     Forced sexual activity: Not on file   Other Topics Concern    Not on file   Social History Narrative    Not on file     Immunizations- reviewed:   Immunization History   Administered Date(s) Administered    Influenza Vaccine (Quad) PF 2017    Tdap 2011, 2017       Objective:     Visit Vitals  /72 (BP 1 Location: Right arm)   Pulse 92   Temp 98.3 °F (36.8 °C) (Oral)   Resp 17   Ht 5' (1.524 m)   Wt 160 lb 12.8 oz (72.9 kg)   LMP 10/01/2019 (LMP Unknown)   SpO2 100%   BMI 31.40 kg/m²       Physical Exam:  GENERAL APPEARANCE: alert, well appearing, in no apparent distress  ABDOMEN: gravid, fundal height cm, FHT present at A/R: 130, B/L: 135 bpm  PSYCH: normal mood and affect    Labs  No results found for this or any previous visit (from the past 12 hour(s)). Assessment         ICD-10-CM ICD-9-CM    1. Prenatal care, antepartum Z34.90 V22.1 AMB POC URINALYSIS DIP STICK AUTO W/O MICRO      DRUG SCREEN, URINE      CANCELED: GLUCOSE, GESTATIONAL 1 HR TOLERANCE   2. Encounter for immunization Z23 V03.89 TETANUS, DIPHTHERIA TOXOIDS AND ACELLULAR PERTUSSIS VACCINE (TDAP), IN INDIVIDS. >=7, IM         Plan   22 y.o.  32w5d JAZMINE 7/3/2020, by Ultrasound here for return OB visit     PNL: O+, Ab screen neg, hgb fract wnl, Hgb 11.1. VZV + Rubella immune. HIV, HepBsAg, Tpall neg. GC/C neg. Pap NILM. 1hr gtt wnl. Anatomy scan - Kinjal Twins discordance, both males.     Di-Di Twin Pregnancy in 2rd Trimester  Pregnancy complicated by twin gestation, anemia in pregnancy  · Tdap today  · Twin Pregnancy  · Monthly MFM scans; last one 4/27 showed Kinjal twins discordance (7% and wnl) - EFW 61st and 43rd%  · Next scan: 6/1  · Will likely require CS at 38w as Twin A is vertex and Twin B is breech  · Continue ASA 81mg  · Anemia in Pregnancy: Hgb 10.6 on 4/24  · Continue ferrous sulfate  · Poor Follow-up:   · Has missed several appointments  · UDS today  · Follow up 5/27 in clinic      Orders Placed This Encounter    Tetanus, diphtheria toxoids and acellular pertussis (TDAP) vaccine, in individuals >=7 years, IM    DRUG SCREEN, URINE     Standing Status:   Future     Standing Expiration Date:   5/13/2021    AMB POC URINALYSIS DIP STICK AUTO W/O MICRO     Labor precautions discussed, including: Regular painful contractions, lasting for greater than one hour, taking your breath away; any vaginal bleeding; any leakage of fluid; or absent or decreased fetal movement. Call M.D. on call if any of these symptoms or signs occur. I have discussed the diagnosis with the patient and the intended plan as seen in the above orders. The patient has received an after-visit summary and questions were answered concerning future plans. I have discussed medication side effects and warnings with the patient as well. Informed pt to return to the office or go to the ER if she experiences vaginal bleeding, vaginal discharge, leaking of fluid, pelvic cramping.     Pt discussed with Dr. Marla Ramos (attending physician)    Theo Meehan MD  Family Medicine Resident

## 2020-05-14 PROBLEM — O30.049 DICHORIONIC DIAMNIOTIC TWIN GESTATION: Status: ACTIVE | Noted: 2020-05-14

## 2020-05-14 PROBLEM — O99.019 ANEMIA IN PREGNANCY: Status: ACTIVE | Noted: 2020-05-14

## 2020-05-14 LAB
BACTERIA SPEC CULT: NORMAL
SERVICE CMNT-IMP: NORMAL

## 2020-05-26 NOTE — PROGRESS NOTES
RETURN OB VISIT SUMMARY    Subjective:   Ms. Nestor Urena is a 22 y.o.  at [facility] [place]:  34w5d JAZMINE: 7/3/2020, by Ultrasound  who presents today for her routine prenatal visit. Her pregnancy is complicated by history of: Di-DI twin pregnancy, anemia  Today she reports: lower abdominal pain that started 2 weeks ago that is sharp/pressure like. Pain is 8/10. Has not taken anything for the pain     Prenatal vitamins: yes    ROS:   She is feeling her baby move. She denies vaginal bleeding, discharge or loss of fluid. She denies nausea, vomiting, severe abdominal pain or cramping. She denies dysuria. She denies headaches, dizziness or vision changes. She denies excessive swelling of extremities. Current Outpatient Medications on File Prior to Visit   Medication Sig Dispense Refill    ferrous sulfate (IRON) 325 mg (65 mg iron) EC tablet Take 1 Tab by mouth daily. For anemia. 90 Tab 1    aspirin 81 mg chewable tablet Take 1 Tab by mouth daily. For preeclampsia prevention 90 Tab 2    PNV no.24-iron-folic acid-dha (PRENATAL DHA+COMPLETE PRENATAL) -300 mg-mcg-mg cmpk Take 1 Tab by mouth daily.  docusate sodium (COLACE) 100 mg capsule Take 1 Cap by mouth two (2) times daily as needed for Constipation. 60 Cap 2    prenatal vit-calcium-iron-fa (PRENATAL PLUS WITH CALCIUM) 27 mg iron- 1 mg tab Take 1 Tab by mouth daily. 90 Tab 2     No current facility-administered medications on file prior to visit.       Patient Active Problem List   Diagnosis Code    Supervision of high-risk pregnancy of young primigravida O12.200    Late prenatal care O65.33    GBS (group B Streptococcus carrier), +RV culture, currently pregnant O99.820    Normal labor O80, Z37.9    Dichorionic diamniotic twin gestation O30.46    Anemia in pregnancy O99.019       Objective:     Visit Vitals  /72   Pulse 89   Temp 98.2 °F (36.8 °C) (Oral)   Resp 17   Ht 5' (1.524 m)   Wt 162 lb (73.5 kg)   LMP 10/01/2019 (LMP Unknown)   SpO2 98% BMI 31.64 kg/m²       Physical Exam:  GENERAL APPEARANCE: alert, well appearing, in no apparent distress, well hydrated  HEAD: normocephalic, atraumatic  ABDOMEN: soft, nontender, nondistended, no abnormal masses, no epigastric pain, fundus soft, nontender, FHT A/R:130, B/L 140 BPM  EXTREMITIES: no redness or tenderness in the calves or thighs, no edema  NEUROLOGICAL: alert, oriented, normal speech, no focal findings or movement disorder noted  SKIN: normal coloration and turgor, no rashes    Labs:  Results for orders placed or performed in visit on 20   AMB POC URINALYSIS DIP STICK AUTO W/O MICRO     Status: None   Result Value Ref Range Status    Color (UA POC) Yellow  Final    Clarity (UA POC) Slightly Cloudy  Final    Glucose (UA POC) Negative Negative Final    Bilirubin (UA POC) Negative Negative Final    Ketones (UA POC) Negative Negative Final    Specific gravity (UA POC) 1.020 1.001 - 1.035 Final    Blood (UA POC) Trace Negative Final    pH (UA POC) 7.0 4.6 - 8.0 Final    Protein (UA POC) Negative Negative Final    Urobilinogen (UA POC) 0.2 mg/dL 0.2 - 1 Final    Nitrites (UA POC) Negative Negative Final    Leukocyte esterase (UA POC) 3+ Negative Final   Results for orders placed or performed in visit on 17   AMB POC URINALYSIS DIP STICK AUTO W/ MICRO     Status: None   Result Value Ref Range Status    Color (UA POC) Yellow  Final    Clarity (UA POC) Slightly Cloudy  Final    Glucose (UA POC) Negative Negative Final    Bilirubin (UA POC) Negative Negative Final    Ketones (UA POC) 2+ Negative Final    Specific gravity (UA POC) 1.030 1.001 - 1.035 Final    Blood (UA POC) Trace Negative Final    pH (UA POC) 6.0 4.6 - 8.0 Final    Protein (UA POC) Trace Negative mg/dL Final    Urobilinogen (UA POC) normal 0.2 - 1 Final    Nitrites (UA POC) Negative Negative Final    Leukocyte esterase (UA POC) Trace Negative Final       Assessment/ Plan:   Ms. Brenton Florentino is a 22 y.o.  at [place]:  34w5d JAZMINE: 7/3/2020, by Ultrasound  who presents today for her routine prenatal visit. Orders Placed This Encounter    CULTURE, URINE     Standing Status:   Future     Standing Expiration Date:   2021    AMB POC URINALYSIS DIP STICK AUTO W/O MICRO       PNL: O+, Ab screen neg, hgb fract wnl, Hgb 11.1. VZV + Rubella immune. HIV, HepBsAg, Tpall neg. GC/C neg. Pap NILM. 1hr gtt wnl. Anatomy scan - Kinjal Twins discordance, both males.     Di-Di Twin Pregnancy in 3rd Trimester  Pregnancy complicated by twin gestation, anemia in pregnancy  ? S/p Tdap   ? UA with 3+ LE and trace blood. Urine culture ordered. ? Abdominal pain: likely round ligament pain. Can take tylenol 1000 mg TID. Do not exceed 4000 mg in a day. ? GBS at next visit  ? Twin Pregnancy  § Monthly MFM scans; last one  showed Kinjal twins discordance (7% and wnl) - EFW 61st and 43rd%  § Next scan: 20  §  scheduled for 20. Information provided to the patient. Arrive at 5:30  § Continue ASA 81mg  ? Anemia in Pregnancy: Hgb 10.6 on   § Continue ferrous sulfate  ? Poor Follow-up:   § Has missed several appointments  § UDS neg    Follow up  - 2 weeks for routine prenatal, appointment made with Dr. Charlotte Mcfarland    I have discussed the diagnosis with the patient and the intended plan as seen in the above orders. The patient has received an after-visit summary and questions were answered concerning future plans.      Medication Side Effects and Warnings were discussed with patient: yes  Patient Labs were reviewed: yes  Patient Past Records were reviewed:  Yes    Patient discussed with Dr. Love Laureano MD  Family Medicine Resident  2020

## 2020-05-27 ENCOUNTER — ROUTINE PRENATAL (OUTPATIENT)
Dept: FAMILY MEDICINE CLINIC | Age: 20
End: 2020-05-27

## 2020-05-27 ENCOUNTER — HOSPITAL ENCOUNTER (OUTPATIENT)
Dept: LAB | Age: 20
Discharge: HOME | End: 2020-05-27

## 2020-05-27 VITALS
HEIGHT: 60 IN | BODY MASS INDEX: 31.8 KG/M2 | WEIGHT: 162 LBS | HEART RATE: 89 BPM | SYSTOLIC BLOOD PRESSURE: 105 MMHG | OXYGEN SATURATION: 98 % | RESPIRATION RATE: 17 BRPM | DIASTOLIC BLOOD PRESSURE: 72 MMHG | TEMPERATURE: 98.2 F

## 2020-05-27 DIAGNOSIS — Z3A.34 34 WEEKS GESTATION OF PREGNANCY: Primary | ICD-10-CM

## 2020-05-27 DIAGNOSIS — O99.019 ANEMIA AFFECTING PREGNANCY, ANTEPARTUM: ICD-10-CM

## 2020-05-27 DIAGNOSIS — O30.042 DICHORIONIC DIAMNIOTIC TWIN PREGNANCY IN SECOND TRIMESTER: ICD-10-CM

## 2020-05-27 DIAGNOSIS — Z3A.34 34 WEEKS GESTATION OF PREGNANCY: ICD-10-CM

## 2020-05-27 LAB
BILIRUB UR QL STRIP: NEGATIVE
GLUCOSE UR-MCNC: NEGATIVE MG/DL
KETONES P FAST UR STRIP-MCNC: NEGATIVE MG/DL
PH UR STRIP: 7 [PH] (ref 4.6–8)
PROT UR QL STRIP: NEGATIVE
SP GR UR STRIP: 1.02 (ref 1–1.03)
UA UROBILINOGEN AMB POC: NORMAL (ref 0.2–1)
URINALYSIS CLARITY POC: NORMAL
URINALYSIS COLOR POC: YELLOW
URINE BLOOD POC: NORMAL
URINE LEUKOCYTES POC: NORMAL
URINE NITRITES POC: NEGATIVE

## 2020-05-27 NOTE — PATIENT INSTRUCTIONS
Weeks 34 to 36 of Your Pregnancy: Care Instructions Your Care Instructions By now, your baby and your belly have grown quite large. It is almost time to give birth. Your baby's lungs are almost ready to breathe air. The bones in your baby's head are now firm enough to protect it, but soft enough to move down through the birth canal. 
You may feel excited, happy, anxious, or scared. You may wonder how you will know if you are in labor or what to expect during labor. Try to be flexible in your expectations of the birth. Because each birth is different, there is no way to know exactly what childbirth will be like for you. This care sheet will help you know what to expect and how to prepare. This may make your childbirth easier. If you haven't already had the Tdap shot during this pregnancy, talk to your doctor about getting it. It will help protect your  against pertussis infection. In the 36th week, most women have a test for group B streptococcus (GBS). GBS is a common bacteria that can live in the vagina and rectum. It can make your baby sick after birth. If you test positive, you will get antibiotics during labor. The medicine will keep your baby from getting the bacteria. Follow-up care is a key part of your treatment and safety. Be sure to make and go to all appointments, and call your doctor if you are having problems. It's also a good idea to know your test results and keep a list of the medicines you take. How can you care for yourself at home? Learn about pain relief choices · Pain is different for every woman. Talk with your doctor about your feelings about pain. · You can choose from several types of pain relief. These include medicine or breathing techniques, as well as comfort measures. You can use more than one option. · If you choose to have pain medicine during labor, talk to your doctor about your options.  Some medicines lower anxiety and help with some of the pain. Others make your lower body numb so that you won't feel pain. · Be sure to tell your doctor about your pain medicine choice before you start labor or very early in your labor. You may be able to change your mind as labor progresses. · Rarely, a woman is put to sleep by medicine given through a mask or an IV. Labor and delivery · The first stage of labor has three parts: early, active, and transition. ? Most women have early labor at home. You can stay busy or rest, eat light snacks, drink clear fluids, and start counting contractions. ? When talking during a contraction gets hard, you may be moving to active labor. During active labor, you should head for the hospital if you are not there already. ? You are in active labor when contractions come every 3 to 4 minutes and last about 60 seconds. Your cervix is opening more rapidly. ? If your water breaks, contractions will come faster and stronger. ? During transition, your cervix is stretching, and contractions are coming more rapidly. ? You may want to push, but your cervix might not be ready. Your doctor will tell you when to push. · The second stage starts when your cervix is completely opened and you are ready to push. ? Contractions are very strong to push the baby down the birth canal. 
? You will feel the urge to push. You may feel like you need to have a bowel movement. ? You may be coached to push with contractions. These contractions will be very strong, but you will not have them as often. You can get a little rest between contractions. ? You may be emotional and irritable. You may not be aware of what is going on around you. 
? One last push, and your baby is born. · The third stage is when a few more contractions push out the placenta. This may take 30 minutes or less. · The fourth stage is the welcome recovery. You may feel overwhelmed with emotions and exhausted but alert. This is a good time to start breastfeeding. Where can you learn more? Go to http://choco-rena.info/ Enter D697 in the search box to learn more about \"Weeks 34 to 36 of Your Pregnancy: Care Instructions. \" Current as of: May 29, 2019Content Version: 12.4 © 3959-3200 Healthwise, Incorporated. Care instructions adapted under license by [employer] (which disclaims liability or warranty for this information). If you have questions about a medical condition or this instruction, always ask your healthcare professional. [place] [address] any warranty or liability for your use of this information. Learning About When to Call Your Doctor During Pregnancy (After 20 Weeks) Your Care Instructions It's common to have concerns about what might be a problem during pregnancy. Although most pregnant women don't have any serious problems, it's important to know when to call your doctor if you have certain symptoms or signs of labor. These are general suggestions. Your doctor may give you some more information about when to call. When to call your doctor (after 20 weeks) Call 911 anytime you think you may need emergency care. For example, call if: 
· You have severe vaginal bleeding. · You have sudden, severe pain in your belly. · You passed out (lost consciousness). · You have a seizure. · You see or feel the umbilical cord. · You think you are about to deliver your baby and can't make it safely to the hospital. 
Call your doctor now or seek immediate medical care if: 
· You have vaginal bleeding. · You have belly pain. · You have a fever. · You have symptoms of preeclampsia, such as: 
? Sudden swelling of your face, hands, or feet. ? New vision problems (such as dimness, blurring, or seeing spots). ? A severe headache. · You have a sudden release of fluid from your vagina. (You think your water broke.) · You think that you may be in labor.  This means that you've had at least 6 contractions in an hour. · You notice that your baby has stopped moving or is moving much less than normal. 
· You have symptoms of a urinary tract infection. These may include: 
? Pain or burning when you urinate. ? A frequent need to urinate without being able to pass much urine. ? Pain in the flank, which is just below the rib cage and above the waist on either side of the back. ? Blood in your urine. Watch closely for changes in your health, and be sure to contact your doctor if: 
· You have vaginal discharge that smells bad. · You have skin changes, such as: ? A rash. ? Itching. ? Yellow color to your skin. · You have other concerns about your pregnancy. If you have labor signs at 37 weeks or more If you have signs of labor at 37 weeks or more, your doctor may tell you to call when your labor becomes more active. Symptoms of active labor include: 
· Contractions that are regular. · Contractions that are less than 5 minutes apart. · Contractions that are hard to talk through. Follow-up care is a key part of your treatment and safety. Be sure to make and go to all appointments, and call your doctor if you are having problems. It's also a good idea to know your test results and keep a list of the medicines you take. Where can you learn more? Go to http://choco-rena.info/ Enter  in the search box to learn more about \"Learning About When to Call Your Doctor During Pregnancy (After 20 Weeks). \" 
Current as of: May 29, 2019Content Version: 12.4 © 5628-9058 Healthwise, Incorporated. Care instructions adapted under license by [employer] (which disclaims liability or warranty for this information). If you have questions about a medical condition or this instruction, always ask your healthcare professional. [place] [address] any warranty or liability for your use of this information. Learning About Twin Pregnancy What is different about a twin pregnancy? In many ways, a twin pregnancy is like a single-baby pregnancy. Healthy twins develop like a single baby does until the last trimester, when their growth slows down. Twins are usually born before the usual 40-week due date. For the mother, carrying twins can be more difficult than carrying a single baby. And her risks are higher for pregnancy problems. That's why keeping up with prenatal checks and tests is especially important. How do twins grow? Twins develop from embryos to babies like a single baby does. · By weeks 10 to 14 of your pregnancy, one or two placentas have formed inside your uterus. It is possible to hear your babies' heartbeats with a special ultrasound device. Your babies' eyes can move. Their arms and legs can bend. · Around weeks 14 to 18, hair is beginning to grow on your babies' heads. · By 18 to 22 weeks, your babies can now suck their thumbs and  firmly with their hands. They can open and close their eyelids. Around this time, you may start to feel your babies move. At first, this might feel like fluttering or \"butterflies. \" 
· Around week 26, you may notice that your babies respond to the sound of your or your partner's voice. You may also notice that they do less turning and twisting and more squirming. · Up to 32 weeks, twins grow rapidly. By this point, they really start to look like babies, with hair and plump skin. · Around 32 to 34 weeks, twins' pace of growth slows down. Their lungs become more ready for breathing. This marks a stage when babies born early are less likely to have breathing problems after birth. What can you expect during a twin pregnancy? With a twin pregnancy, your body makes high levels of pregnancy hormones. So morning sickness may come on earlier and stronger than if you were carrying a single baby.  You may also have earlier and more intense symptoms from pregnancy, like swelling, heartburn, leg cramps, bladder discomfort, and sleep problems. Your belly may grow bigger, and you may gain more weight, sooner. Talk to your doctor about how much you might expect to gain. When you're carrying twins, you and your babies may be tested and checked more than you would for a single-baby pregnancy. · At about 10 weeks, an ultrasound can show if the babies are growing in the same amniotic sac. If they each have their own sac and their own placenta, twins have the best chances of both growing well. · Between weeks 18 and 20, an ultrasound may be able to show the sex of your babies. · Later in your pregnancy, you will start to have checkups more often. This will be sooner than for a single-baby pregnancy. Twins tend to be born early, but 37 weeks is a goal when mother and babies are doing well. As you get closer to delivery time, your medical team will help you know what to expect and what to do. As questions come to mind, keep a list so you can remember to ask your doctor. Follow-up care is a key part of your treatment and safety. Be sure to make and go to all appointments, and call your doctor if you are having problems. It's also a good idea to know your test results and keep a list of the medicines you take. Where can you learn more? Go to http://choco-rena.info/ Enter R070 in the search box to learn more about \"Learning About Twin Pregnancy. \" Current as of: May 29, 2019Content Version: 12.4 © 7744-0240 Healthwise, Incorporated. Care instructions adapted under license by [employer] (which disclaims liability or warranty for this information). If you have questions about a medical condition or this instruction, always ask your healthcare professional. [address] any warranty or liability for your use of this information. Round Ligament Pain: Care Instructions Your Care Instructions Round ligament pain is a common pain during pregnancy. You may feel a sharp brief pain on one or both sides of your belly. It may go down into your groin. It's usually felt for the first time during the second trimester. This pain is a normal part of pregnancy. It will go away as your pregnancy continues or after your baby is born. Your uterus is supported by two ligaments that go from the top and sides of the uterus to the bones of the pelvis. These are the round ligaments. As your uterus grows, these ligaments stretch and tighten with your movements. This may be the cause of the pain. You may find that certain activities seem to cause pain. If you can, avoid those activities. Your doctor can usually diagnose round ligament pain from your symptoms and an exam. If you have bleeding or other symptoms, your doctor may also do an imaging test, such as an ultrasound. Your doctor may suggest that you take an over-the-counter pain medicine, such as acetaminophen. Follow-up care is a key part of your treatment and safety. Be sure to make and go to all appointments, and call your doctor if you are having problems. It's also a good idea to know your test results and keep a list of the medicines you take. How can you care for yourself at home? · If certain movements seem to trigger the pain, see if you can avoid them while you are pregnant. · Stay active. If your doctor says it's okay, try moderate exercise. Many pregnant women find that water exercise is most comfortable. Examples are swimming and water aerobics. · Ask your doctor about taking acetaminophen for pain. Be safe with medicines. Read and follow all instructions on the label. When should you call for help? Call your doctor now or seek immediate medical care if: 
  · You think you might be in labor.  
  · You have new or worse pain.  
 Watch closely for changes in your health, and be sure to contact your doctor if you have any problems. Where can you learn more? Go to http://choco-rena.info/ Enter R110 in the search box to learn more about \"Round Ligament Pain: Care Instructions. \" Current as of: May 29, 2019Content Version: 12.4 © 3501-8171 Healthwise, Incorporated. Care instructions adapted under license by [employer] (which disclaims liability or warranty for this information). If you have questions about a medical condition or this instruction, always ask your healthcare professional. [address] any warranty or liability for your use of this information.

## 2020-05-27 NOTE — PROGRESS NOTES
Chief Complaint   Patient presents with    Routine Prenatal Visit     34w 5d     1. Have you been to the ER, urgent care clinic since your last visit? Hospitalized since your last visit? No    2. Have you seen or consulted any other health care providers outside of the [address] [place] [address] since your last visit? Include any pap smears or colon screening.  No      Patient denies having any bleeding, cramping and spotting or leakage of fluid    Patient states that she feels fetal movement    Scant clear leakage of fluid/no bleeding    posiitive fetal movemens

## 2020-05-27 NOTE — PROGRESS NOTES
I reviewed with the resident the medical history and the resident's findings on the physical examination. I discussed with the resident the patient's diagnosis and concur with the plan. Estimated Date of Delivery: 7/3/20    18yo  @ 34w5d by 15wk scan   1. Samira twin IUP: EFW 61st and 43rd%, MFM following-next scan , GTT WNL, s/p tdap, RH pos, section at 38 weeks and can continue to follow presentation -  at 7:30, pt to arrive at 5:30 fasting and is aware. Chart sent to Dr. Shelia Mg. 2.  Noncompliance: spotty care, will get UDS   3.   Anemia: mild on iron

## 2020-05-28 LAB
BACTERIA SPEC CULT: ABNORMAL
CC UR VC: ABNORMAL
SERVICE CMNT-IMP: ABNORMAL

## 2020-06-01 ENCOUNTER — HOSPITAL ENCOUNTER (OUTPATIENT)
Dept: PERINATAL CARE | Age: 20
Discharge: HOME | End: 2020-06-01
Attending: OBSTETRICS & GYNECOLOGY
Payer: SELF-PAY

## 2020-06-01 ENCOUNTER — TELEPHONE (OUTPATIENT)
Dept: FAMILY MEDICINE CLINIC | Age: 20
End: 2020-06-01

## 2020-06-01 PROCEDURE — 76816 OB US FOLLOW-UP PER FETUS: CPT | Performed by: OBSTETRICS & GYNECOLOGY

## 2020-06-01 NOTE — TELEPHONE ENCOUNTER
Patient states she has ultrasound today at [facility] and forgot the time. Provided information below    Encounter Information      Provider Department Encounter # Center   6/1/2020 2:00 PM ULTRASOUND 2 [facility] [address] [facility] [id number] [facility] [place]

## 2020-06-04 NOTE — PROGRESS NOTES
Estimated Date of Delivery: 7/3/20    Kinjal twins  EFW 87YA%/38ZC%  Cephalic/transverse  CS scheduled

## 2020-06-08 NOTE — PROGRESS NOTES
Return OB Visit     Subjective:   Latisha Collins 19 y.o.   JAZMINE: 7/3/2020, by Ultrasound  GA:  36w5d. does have complaints itchy rash on chest and arms that started 1 week ago. Denies itching on palms or soles. No one else in family with similar issues. Has not tried anything on it. is taking daily prenatal vitamin. Trying to stay well hydrated and eat a balanced diet. Denies vaginal bleeding or discharge, dysuria, LOF, nausea, vomiting, severe abdominal pain or cramping, HA, dizziness, vision changes, or RUQ pain. +FM. Past Medical History - Reviewed today  Patient Active Problem List   Diagnosis Code    Supervision of high-risk pregnancy of young primigravida O12.200    Late prenatal care O65.33    GBS (group B Streptococcus carrier), +RV culture, currently pregnant O99.820    Normal labor O80, Z37.9    Dichorionic diamniotic twin gestation O30.46    Anemia in pregnancy O99.019         Medications - Reviewed today  Current Outpatient Medications   Medication Sig Dispense Refill    ferrous sulfate (IRON) 325 mg (65 mg iron) EC tablet Take 1 Tab by mouth daily. For anemia. 90 Tab 1    prenatal vit-calcium-iron-fa (PRENATAL PLUS WITH CALCIUM) 27 mg iron- 1 mg tab Take 1 Tab by mouth daily. 90 Tab 2    aspirin 81 mg chewable tablet Take 1 Tab by mouth daily. For preeclampsia prevention 90 Tab 2    PNV no.24-iron-folic acid-dha (PRENATAL DHA+COMPLETE PRENATAL) -300 mg-mcg-mg cmpk Take 1 Tab by mouth daily.  docusate sodium (COLACE) 100 mg capsule Take 1 Cap by mouth two (2) times daily as needed for Constipation. 60 Cap 2         Allergies - Reviewed today  No Known Allergies      Family History - Reviewed today  No family history on file.       Social History - Reviewed today  Social History     Socioeconomic History    Marital status: SINGLE     Spouse name: Not on file    Number of children: Not on file    Years of education: Not on file    Highest education level: Not on file   Occupational History    Not on file   Social Needs    Financial resource strain: Not on file    Food insecurity     Worry: Not on file     Inability: Not on file    Transportation needs     Medical: Not on file     Non-medical: Not on file   Tobacco Use    Smoking status: Never Smoker    Smokeless tobacco: Never Used   Substance and Sexual Activity    Alcohol use: No    Drug use: No    Sexual activity: Yes     Partners: Male     Birth control/protection: None   Lifestyle    Physical activity     Days per week: Not on file     Minutes per session: Not on file    Stress: Not on file   Relationships    Social connections     Talks on phone: Not on file     Gets together: Not on file     Attends Anabaptism service: Not on file     Active member of club or organization: Not on file     Attends meetings of clubs or organizations: Not on file     Relationship status: Not on file    Intimate partner violence     Fear of current or ex partner: Not on file     Emotionally abused: Not on file     Physically abused: Not on file     Forced sexual activity: Not on file   Other Topics Concern    Not on file   Social History Narrative    Not on file    a/r  B/L      Health Maintenance - Reviewed today   Immunizations:     -Influenza: not indicated     -Tdap: 20    Pap: UTD    Objective:     Visit Vitals  /57 (BP 1 Location: Left arm, BP Patient Position: Sitting)   Pulse 89   Temp 98.8 °F (37.1 °C) (Oral)   Resp 16   Ht 5' (1.524 m)   Wt 165 lb (74.8 kg)   LMP 10/01/2019 (LMP Unknown)   SpO2 99%   BMI 32.22 kg/m²       Physical Exam:  GENERAL APPEARANCE: alert, well appearing, in no apparent distress  UTERUS: gravid, 37 cm,  FHT present      Assessment and Plan   Patient is a  at 36w5d  according to dating from 15 week ultrasound. Di-Di Twin Pregnancy in 2rd Trimester - [place] Twins discordance of 4% (WNL) from anatomy scan on 20, both males. Baby A cephalic, baby B transverse. ? PNL: O+, Ab screen neg, hgb fract wnl, Hgb 11.1. VZV + Rubella immune. HIV, HepBsAg, Tpall neg. GC/C neg. Pap NILM. 1hr gtt wnl.  ? Scheduled for  20,  Information provided to the patient. Arrive at 5:30a  ? UA trace protein and 3+LE  ? GBS today  ? Continue ASA 81mg  ? Discussed COVID testing requirement prior to scheduled   ? UDS negative    Itching - discussed use of topical anti-itch and benadryl PRN for sytemic itching. Discussed monitoring for itching on palms and soles of feet. - Baseline bile acids and CMP today    Abdominal pain: likely round ligament pain. Can take tylenol 1000 mg TID. Do not exceed 4000 mg in a day. Anemia in Pregnancy: Hgb 10.6 on   - Continue ferrous sulfate    Poor Follow-up:   - Has missed several appointments  - UDS neg    Labor precautions discussed, including: Regular painful contractions, lasting for greater than one hour, taking your breath away; any vaginal bleeding; any leakage of fluid; or absent or decreased fetal movement. Call M.D. on call if any of these symptoms or signs occur. I have discussed the diagnosis with the patient and the intended plan as seen in the above orders. The patient has received an after-visit summary and questions were answered concerning future plans. I have discussed medication side effects and warnings with the patient as well.     Patient seen and discussed with Dr. Ricardo Adkins MD  [facility] Medicine Resident

## 2020-06-10 ENCOUNTER — HOSPITAL ENCOUNTER (OUTPATIENT)
Dept: LAB | Age: 20
Discharge: HOME | End: 2020-06-10

## 2020-06-10 ENCOUNTER — ROUTINE PRENATAL (OUTPATIENT)
Dept: FAMILY MEDICINE CLINIC | Age: 20
End: 2020-06-10

## 2020-06-10 VITALS
OXYGEN SATURATION: 99 % | BODY MASS INDEX: 32.39 KG/M2 | DIASTOLIC BLOOD PRESSURE: 57 MMHG | SYSTOLIC BLOOD PRESSURE: 120 MMHG | RESPIRATION RATE: 16 BRPM | HEIGHT: 60 IN | TEMPERATURE: 98.8 F | HEART RATE: 89 BPM | WEIGHT: 165 LBS

## 2020-06-10 DIAGNOSIS — L29.9 PRURITUS OF PREGNANCY IN THIRD TRIMESTER: ICD-10-CM

## 2020-06-10 DIAGNOSIS — O30.042 DICHORIONIC DIAMNIOTIC TWIN PREGNANCY IN SECOND TRIMESTER: Primary | ICD-10-CM

## 2020-06-10 DIAGNOSIS — Z3A.36 36 WEEKS GESTATION OF PREGNANCY: ICD-10-CM

## 2020-06-10 DIAGNOSIS — O30.042 DICHORIONIC DIAMNIOTIC TWIN PREGNANCY IN SECOND TRIMESTER: ICD-10-CM

## 2020-06-10 DIAGNOSIS — O99.713 PRURITUS OF PREGNANCY IN THIRD TRIMESTER: ICD-10-CM

## 2020-06-10 DIAGNOSIS — O99.019 ANEMIA AFFECTING PREGNANCY, ANTEPARTUM: ICD-10-CM

## 2020-06-10 LAB
ALBUMIN SERPL-MCNC: 2.6 G/DL (ref 3.5–5)
ALBUMIN/GLOB SERPL: 0.8 {RATIO} (ref 1.1–2.2)
ALP SERPL-CCNC: 187 U/L (ref 45–117)
ALT SERPL-CCNC: 17 U/L (ref 12–78)
ANION GAP SERPL CALC-SCNC: 14 MMOL/L (ref 5–15)
AST SERPL-CCNC: 21 U/L (ref 15–37)
BILIRUB SERPL-MCNC: 0.4 MG/DL (ref 0.2–1)
BILIRUB UR QL STRIP: NEGATIVE
BUN SERPL-MCNC: 9 MG/DL (ref 6–20)
BUN/CREAT SERPL: 20 (ref 12–20)
CALCIUM SERPL-MCNC: 8.6 MG/DL (ref 8.5–10.1)
CHLORIDE SERPL-SCNC: 112 MMOL/L (ref 97–108)
CO2 SERPL-SCNC: 19 MMOL/L (ref 21–32)
CREAT SERPL-MCNC: 0.44 MG/DL (ref 0.55–1.02)
GLOBULIN SER CALC-MCNC: 3.2 G/DL (ref 2–4)
GLUCOSE SERPL-MCNC: 75 MG/DL (ref 65–100)
GLUCOSE UR-MCNC: NEGATIVE MG/DL
GRBS, EXTERNAL: NEGATIVE
KETONES P FAST UR STRIP-MCNC: NEGATIVE MG/DL
PH UR STRIP: 7 [PH] (ref 4.6–8)
POTASSIUM SERPL-SCNC: 4.5 MMOL/L (ref 3.5–5.1)
PROT SERPL-MCNC: 5.8 G/DL (ref 6.4–8.2)
PROT UR QL STRIP: NORMAL
SODIUM SERPL-SCNC: 145 MMOL/L (ref 136–145)
SP GR UR STRIP: 1.02 (ref 1–1.03)
UA UROBILINOGEN AMB POC: NORMAL (ref 0.2–1)
URINALYSIS CLARITY POC: NORMAL
URINALYSIS COLOR POC: YELLOW
URINE BLOOD POC: NORMAL
URINE LEUKOCYTES POC: NORMAL
URINE NITRITES POC: NEGATIVE

## 2020-06-10 NOTE — PROGRESS NOTES
I reviewed with the resident the medical history and the resident's findings on the physical examination. I discussed with the resident the patient's diagnosis and concur with the plan. Estimated Date of Delivery: 7/3/20    18yo  @ 36w5d by 15wk scan   1. Samira twin IUP: EFW 61st and 43rd%, MFM following-next scan , GTT WNL, s/p tdap, RH pos, section at 38 weeks and can continue to follow presentation -  at 7:30, pt to arrive at 5:30 fasting and is aware. Chart sent to Dr. Martine Lamar. 2.  Noncompliance: spotty care, UDS neg   3. Anemia: mild on iron   4.   Itching: will check BA and LFTs

## 2020-06-10 NOTE — PROGRESS NOTES
Chief Complaint   Patient presents with    Routine Prenatal Visit     36w 5 d     1. Have you been to the ER, urgent care clinic since your last visit? Hospitalized since your last visit? No    2. Have you seen or consulted any other health care providers outside of the [address] [place] [address] since your last visit? Include any pap smears or colon screening.  No

## 2020-06-11 ENCOUNTER — TELEPHONE (OUTPATIENT)
Dept: FAMILY MEDICINE CLINIC | Age: 20
End: 2020-06-11

## 2020-06-11 LAB — BILE AC SERPL-SCNC: 7.6 UMOL/L (ref 0–10)

## 2020-06-11 NOTE — TELEPHONE ENCOUNTER
Attempted to call pt. No answer and voicemail not setup.  Need to inform pt that labs results are normal. Statement Selected

## 2020-06-13 LAB
BACTERIA SPEC CULT: NORMAL
SERVICE CMNT-IMP: NORMAL

## 2020-06-15 ENCOUNTER — ROUTINE PRENATAL (OUTPATIENT)
Dept: FAMILY MEDICINE CLINIC | Age: 20
End: 2020-06-15

## 2020-06-15 VITALS
TEMPERATURE: 98.2 F | WEIGHT: 170 LBS | SYSTOLIC BLOOD PRESSURE: 106 MMHG | OXYGEN SATURATION: 98 % | BODY MASS INDEX: 33.38 KG/M2 | HEART RATE: 87 BPM | RESPIRATION RATE: 15 BRPM | DIASTOLIC BLOOD PRESSURE: 63 MMHG | HEIGHT: 60 IN

## 2020-06-15 DIAGNOSIS — O30.042 DICHORIONIC DIAMNIOTIC TWIN PREGNANCY IN SECOND TRIMESTER: Primary | ICD-10-CM

## 2020-06-15 DIAGNOSIS — O99.019 ANEMIA AFFECTING PREGNANCY, ANTEPARTUM: ICD-10-CM

## 2020-06-15 DIAGNOSIS — O09.93 HIGH-RISK PREGNANCY IN THIRD TRIMESTER: ICD-10-CM

## 2020-06-15 DIAGNOSIS — Z3A.37 37 WEEKS GESTATION OF PREGNANCY: ICD-10-CM

## 2020-06-15 LAB
BILIRUB UR QL STRIP: NEGATIVE
GLUCOSE UR-MCNC: NEGATIVE MG/DL
KETONES P FAST UR STRIP-MCNC: NORMAL MG/DL
PH UR STRIP: 6.5 [PH] (ref 4.6–8)
PROT UR QL STRIP: NORMAL
SP GR UR STRIP: 1.02 (ref 1–1.03)
UA UROBILINOGEN AMB POC: NORMAL (ref 0.2–1)
URINALYSIS CLARITY POC: NORMAL
URINALYSIS COLOR POC: YELLOW
URINE BLOOD POC: NORMAL
URINE LEUKOCYTES POC: NORMAL
URINE NITRITES POC: NEGATIVE

## 2020-06-15 NOTE — PROGRESS NOTES
I reviewed with the resident the medical history and the resident's findings on the physical examination. I discussed with the resident the patient's diagnosis and concur with the plan. Estimated Date of Delivery: 7/3/20    20yo  @ 37w3d by 15wk scan   1. Samira twin IUP: EFW 61st and 43rd%, MFM following-next scan , GTT WNL, s/p tdap, RH pos, GBS neg, section at 38 weeks -  at 7:30, pt to arrive at 5:30 fasting and is aware. [facility]. 2.  Noncompliance: spotty care, UDS neg   3. Anemia: mild on iron   4.   Itching: BA normal

## 2020-06-15 NOTE — PROGRESS NOTES
Return OB Visit     Subjective:   Yared Walden 19 y.o.   JAZMINE: 7/3/2020, by Ultrasound  GA:  37w3d. does have complaints itchy rash on chest and arms that started 1 week ago. Denies itching on palms or soles. No one else in family with similar issues. Has not tried anything on it. is taking daily prenatal vitamin. Trying to stay well hydrated and eat a balanced diet. Denies vaginal bleeding or discharge, dysuria, LOF, nausea, vomiting, severe abdominal pain or cramping, HA, dizziness, vision changes, or RUQ pain. +FM. Past Medical History - Reviewed today  Patient Active Problem List   Diagnosis Code    Supervision of high-risk pregnancy of young primigravida O12.200    Late prenatal care O65.33    GBS (group B Streptococcus carrier), +RV culture, currently pregnant O99.820    Normal labor O80, Z37.9    Dichorionic diamniotic twin gestation O30.46    Anemia in pregnancy O99.019         Medications - Reviewed today  Current Outpatient Medications   Medication Sig Dispense Refill    ferrous sulfate (IRON) 325 mg (65 mg iron) EC tablet Take 1 Tab by mouth daily. For anemia. 90 Tab 1    prenatal vit-calcium-iron-fa (PRENATAL PLUS WITH CALCIUM) 27 mg iron- 1 mg tab Take 1 Tab by mouth daily. 90 Tab 2    aspirin 81 mg chewable tablet Take 1 Tab by mouth daily. For preeclampsia prevention 90 Tab 2    PNV no.24-iron-folic acid-dha (PRENATAL DHA+COMPLETE PRENATAL) -300 mg-mcg-mg cmpk Take 1 Tab by mouth daily.  docusate sodium (COLACE) 100 mg capsule Take 1 Cap by mouth two (2) times daily as needed for Constipation. 60 Cap 2         Allergies - Reviewed today  No Known Allergies      Family History - Reviewed today  No family history on file.       Social History - Reviewed today  Social History     Socioeconomic History    Marital status: SINGLE     Spouse name: Not on file    Number of children: Not on file    Years of education: Not on file    Highest education level: Not on file   Occupational History    Not on file   Social Needs    Financial resource strain: Not on file    Food insecurity     Worry: Not on file     Inability: Not on file    Transportation needs     Medical: Not on file     Non-medical: Not on file   Tobacco Use    Smoking status: Never Smoker    Smokeless tobacco: Never Used   Substance and Sexual Activity    Alcohol use: No    Drug use: No    Sexual activity: Yes     Partners: Male     Birth control/protection: None   Lifestyle    Physical activity     Days per week: Not on file     Minutes per session: Not on file    Stress: Not on file   Relationships    Social connections     Talks on phone: Not on file     Gets together: Not on file     Attends [employer] service: Not on file     Active member of club or organization: Not on file     Attends meetings of clubs or organizations: Not on file     Relationship status: Not on file    Intimate partner violence     Fear of current or ex partner: Not on file     Emotionally abused: Not on file     Physically abused: Not on file     Forced sexual activity: Not on file   Other Topics Concern    Not on file   Social History Narrative    Not on file    a/r  B/L      Health Maintenance - Reviewed today   Immunizations:     -Influenza: not indicated     -Tdap: 20    Pap: UTD    Objective:     Visit Vitals  /63 (BP 1 Location: Right arm, BP Patient Position: Sitting)   Pulse 87   Temp 98.2 °F (36.8 °C) (Oral)   Resp 15   Ht 5' (1.524 m)   Wt 170 lb (77.1 kg)   LMP 10/01/2019 (LMP Unknown)   SpO2 98%   BMI 33.20 kg/m²       Physical Exam:  GENERAL APPEARANCE: alert, well appearing, in no apparent distress  UTERUS: gravid, 39 cm,  FHT present in A and B      Assessment and Plan   Patient is a  at 37w3d  according to dating from 15 week ultrasound. Di-Di Twin Pregnancy in 2rd Trimester - Kinjal Twins discordance of 4% (WNL) from anatomy scan on 20, both males.  Baby A cephalic, baby B transverse. ?  PNL: O+, Ab screen neg, hgb fract wnl, Hgb 11.1. VZV + Rubella immune. HIV, HepBsAg, Tpall neg. GC/C neg. Pap NILM. 1hr gtt wnl. GBS neg  ? Scheduled for  20,  Information provided to the patient. Arrive at 5:30a  ? UA wnl  ? Continue ASA 81mg  ? Discussed COVID testing requirement prior to scheduled , pt plans to go tomorrow  ? UDS negative    Gravidum pruritis - discussed use of topical anti-itch and benadryl PRN for sytemic itching. Discussed monitoring for itching on palms and soles of feet. - Baseline bile acids and CMP from 6/10/20 wnl    Abdominal/groin pain: likely round ligament pain. Can take tylenol 1000 mg TID. Do not exceed 4000 mg in a day. Anemia in Pregnancy: Hgb 10.6 on   - Continue ferrous sulfate    Poor Follow-up:   - Has missed several appointments  - UDS neg    Labor precautions discussed, including: Regular painful contractions, lasting for greater than one hour, taking your breath away; any vaginal bleeding; any leakage of fluid; or absent or decreased fetal movement. Call M.D. on call if any of these symptoms or signs occur. I have discussed the diagnosis with the patient and the intended plan as seen in the above orders. The patient has received an after-visit summary and questions were answered concerning future plans. I have discussed medication side effects and warnings with the patient as well.     Patient seen and discussed with Dr. Sohail Graham MD  [facility] Medicine Resident

## 2020-06-15 NOTE — PROGRESS NOTES
Chief Complaint   Patient presents with    Routine Prenatal Visit     37w 3d  pt denies bleeding or leakage of fluids. States contractions at times . Fetal movement prsent      1. Have you been to the ER, urgent care clinic since your last visit? Hospitalized since your last visit? No    2. Have you seen or consulted any other health care providers outside of the [address] [place] [address] since your last visit? Include any pap smears or colon screening.  No

## 2020-06-16 ENCOUNTER — HOSPITAL ENCOUNTER (OUTPATIENT)
Dept: LAB | Age: 20
Discharge: HOME | End: 2020-06-16
Payer: OTHER GOVERNMENT

## 2020-06-16 DIAGNOSIS — U07.1 ASYMPTOMATIC COVID-19 VIRUS INFECTION: ICD-10-CM

## 2020-06-16 PROCEDURE — 87635 SARS-COV-2 COVID-19 AMP PRB: CPT

## 2020-06-17 LAB — SARS-COV-2, COV2NT: NOT DETECTED

## 2020-06-19 ENCOUNTER — ANESTHESIA (OUTPATIENT)
Dept: LABOR AND DELIVERY | Age: 20
End: 2020-06-19
Payer: SELF-PAY

## 2020-06-19 ENCOUNTER — ANESTHESIA EVENT (OUTPATIENT)
Dept: LABOR AND DELIVERY | Age: 20
End: 2020-06-19
Payer: SELF-PAY

## 2020-06-19 ENCOUNTER — HOSPITAL ENCOUNTER (INPATIENT)
Age: 20
LOS: 2 days | Discharge: HOME | End: 2020-06-21
Attending: OBSTETRICS & GYNECOLOGY | Admitting: OBSTETRICS & GYNECOLOGY
Payer: SELF-PAY

## 2020-06-19 DIAGNOSIS — Z98.891 S/P CESAREAN SECTION: Primary | ICD-10-CM

## 2020-06-19 PROBLEM — Z34.90 PREGNANT: Status: ACTIVE | Noted: 2020-06-19

## 2020-06-19 LAB
ABO + RH BLD: NORMAL
BASOPHILS # BLD: 0 K/UL (ref 0–0.1)
BASOPHILS NFR BLD: 1 % (ref 0–1)
BLOOD GROUP ANTIBODIES SERPL: NORMAL
DIFFERENTIAL METHOD BLD: ABNORMAL
EOSINOPHIL # BLD: 0.2 K/UL (ref 0–0.4)
EOSINOPHIL NFR BLD: 2 % (ref 0–7)
ERYTHROCYTE [DISTWIDTH] IN BLOOD BY AUTOMATED COUNT: 15.6 % (ref 11.5–14.5)
HCT VFR BLD AUTO: 36.5 % (ref 35–47)
HGB BLD-MCNC: 12.1 G/DL (ref 11.5–16)
IMM GRANULOCYTES # BLD AUTO: 0.1 K/UL (ref 0–0.04)
IMM GRANULOCYTES NFR BLD AUTO: 1 % (ref 0–0.5)
LYMPHOCYTES # BLD: 2.3 K/UL (ref 0.8–3.5)
LYMPHOCYTES NFR BLD: 29 % (ref 12–49)
MCH RBC QN AUTO: 29.8 PG (ref 26–34)
MCHC RBC AUTO-ENTMCNC: 33.2 G/DL (ref 30–36.5)
MCV RBC AUTO: 89.9 FL (ref 80–99)
MONOCYTES # BLD: 0.7 K/UL (ref 0–1)
MONOCYTES NFR BLD: 8 % (ref 5–13)
NEUTS SEG # BLD: 4.9 K/UL (ref 1.8–8)
NEUTS SEG NFR BLD: 60 % (ref 32–75)
NRBC # BLD: 0 K/UL (ref 0–0.01)
NRBC BLD-RTO: 0 PER 100 WBC
PLATELET # BLD AUTO: 146 K/UL (ref 150–400)
RBC # BLD AUTO: 4.06 M/UL (ref 3.8–5.2)
SPECIMEN EXP DATE BLD: NORMAL
WBC # BLD AUTO: 8.2 K/UL (ref 3.6–11)

## 2020-06-19 PROCEDURE — 85025 COMPLETE CBC W/AUTO DIFF WBC: CPT

## 2020-06-19 PROCEDURE — 74011250636 HC RX REV CODE- 250/636: Performed by: OBSTETRICS & GYNECOLOGY

## 2020-06-19 PROCEDURE — 77030008459 HC STPLR SKN COOP -B

## 2020-06-19 PROCEDURE — 77030018836 HC SOL IRR NACL ICUM -A

## 2020-06-19 PROCEDURE — 74011250636 HC RX REV CODE- 250/636: Performed by: NURSE ANESTHETIST, CERTIFIED REGISTERED

## 2020-06-19 PROCEDURE — 76010000392 HC C SECN EA ADDL 0.5 HR: Performed by: OBSTETRICS & GYNECOLOGY

## 2020-06-19 PROCEDURE — 36415 COLL VENOUS BLD VENIPUNCTURE: CPT

## 2020-06-19 PROCEDURE — 86900 BLOOD TYPING SEROLOGIC ABO: CPT

## 2020-06-19 PROCEDURE — 76060000078 HC EPIDURAL ANESTHESIA: Performed by: OBSTETRICS & GYNECOLOGY

## 2020-06-19 PROCEDURE — 74011250636 HC RX REV CODE- 250/636: Performed by: ANESTHESIOLOGY

## 2020-06-19 PROCEDURE — 77030018809 HC RETRCTR ALXSO DISP AMR -B

## 2020-06-19 PROCEDURE — 74011250636 HC RX REV CODE- 250/636: Performed by: STUDENT IN AN ORGANIZED HEALTH CARE EDUCATION/TRAINING PROGRAM

## 2020-06-19 PROCEDURE — 74011000250 HC RX REV CODE- 250: Performed by: OBSTETRICS & GYNECOLOGY

## 2020-06-19 PROCEDURE — 77030007866 HC KT SPN ANES BBMI -B: Performed by: ANESTHESIOLOGY

## 2020-06-19 PROCEDURE — 75410000003 HC RECOV DEL/VAG/CSECN EA 0.5 HR: Performed by: OBSTETRICS & GYNECOLOGY

## 2020-06-19 PROCEDURE — 77010026065 HC OXYGEN MINIMUM MEDICAL AIR: Performed by: OBSTETRICS & GYNECOLOGY

## 2020-06-19 PROCEDURE — 65270000029 HC RM PRIVATE

## 2020-06-19 PROCEDURE — 76815 OB US LIMITED FETUS(S): CPT | Performed by: OBSTETRICS & GYNECOLOGY

## 2020-06-19 PROCEDURE — 77030005513 HC CATH URETH FOL11 MDII -B

## 2020-06-19 PROCEDURE — 77030040361 HC SLV COMPR DVT MDII -B

## 2020-06-19 PROCEDURE — 74011000250 HC RX REV CODE- 250: Performed by: NURSE ANESTHETIST, CERTIFIED REGISTERED

## 2020-06-19 PROCEDURE — 76010000391 HC C SECN FIRST 1 HR: Performed by: OBSTETRICS & GYNECOLOGY

## 2020-06-19 PROCEDURE — 88307 TISSUE EXAM BY PATHOLOGIST: CPT

## 2020-06-19 RX ORDER — DOCUSATE SODIUM 100 MG/1
100 CAPSULE, LIQUID FILLED ORAL 2 TIMES DAILY
Status: DISCONTINUED | OUTPATIENT
Start: 2020-06-19 | End: 2020-06-21 | Stop reason: HOSPADM

## 2020-06-19 RX ORDER — IBUPROFEN 800 MG/1
800 TABLET ORAL
Qty: 30 TAB | Refills: 0 | Status: SHIPPED | OUTPATIENT
Start: 2020-06-19

## 2020-06-19 RX ORDER — OXYTOCIN/0.9 % SODIUM CHLORIDE 20/1000 ML
999 PLASTIC BAG, INJECTION (ML) INTRAVENOUS ONCE
Status: ACTIVE | OUTPATIENT
Start: 2020-06-19 | End: 2020-06-19

## 2020-06-19 RX ORDER — SODIUM CHLORIDE, SODIUM LACTATE, POTASSIUM CHLORIDE, CALCIUM CHLORIDE 600; 310; 30; 20 MG/100ML; MG/100ML; MG/100ML; MG/100ML
125 INJECTION, SOLUTION INTRAVENOUS CONTINUOUS
Status: DISCONTINUED | OUTPATIENT
Start: 2020-06-19 | End: 2020-06-21 | Stop reason: HOSPADM

## 2020-06-19 RX ORDER — KETOROLAC TROMETHAMINE 30 MG/ML
30 INJECTION, SOLUTION INTRAMUSCULAR; INTRAVENOUS
Status: DISPENSED | OUTPATIENT
Start: 2020-06-19 | End: 2020-06-20

## 2020-06-19 RX ORDER — SIMETHICONE 80 MG
80 TABLET,CHEWABLE ORAL 4 TIMES DAILY
Status: DISCONTINUED | OUTPATIENT
Start: 2020-06-19 | End: 2020-06-21 | Stop reason: HOSPADM

## 2020-06-19 RX ORDER — SODIUM CHLORIDE 0.9 % (FLUSH) 0.9 %
5-40 SYRINGE (ML) INJECTION EVERY 8 HOURS
Status: DISCONTINUED | OUTPATIENT
Start: 2020-06-19 | End: 2020-06-21 | Stop reason: HOSPADM

## 2020-06-19 RX ORDER — DOCUSATE SODIUM 100 MG/1
100 CAPSULE, LIQUID FILLED ORAL
Qty: 20 CAP | Refills: 0 | Status: SHIPPED | OUTPATIENT
Start: 2020-06-19 | End: 2020-07-09

## 2020-06-19 RX ORDER — ONDANSETRON 2 MG/ML
INJECTION INTRAMUSCULAR; INTRAVENOUS AS NEEDED
Status: DISCONTINUED | OUTPATIENT
Start: 2020-06-19 | End: 2020-06-19 | Stop reason: HOSPADM

## 2020-06-19 RX ORDER — BUPIVACAINE HYDROCHLORIDE 7.5 MG/ML
INJECTION, SOLUTION EPIDURAL; RETROBULBAR
Status: SHIPPED | OUTPATIENT
Start: 2020-06-19 | End: 2020-06-19

## 2020-06-19 RX ORDER — OXYTOCIN 10 [USP'U]/ML
INJECTION, SOLUTION INTRAMUSCULAR; INTRAVENOUS AS NEEDED
Status: DISCONTINUED | OUTPATIENT
Start: 2020-06-19 | End: 2020-06-19 | Stop reason: HOSPADM

## 2020-06-19 RX ORDER — MORPHINE SULFATE 0.5 MG/ML
INJECTION, SOLUTION EPIDURAL; INTRATHECAL; INTRAVENOUS
Status: SHIPPED | OUTPATIENT
Start: 2020-06-19 | End: 2020-06-19

## 2020-06-19 RX ORDER — NALOXONE HYDROCHLORIDE 0.4 MG/ML
0.4 INJECTION, SOLUTION INTRAMUSCULAR; INTRAVENOUS; SUBCUTANEOUS AS NEEDED
Status: DISCONTINUED | OUTPATIENT
Start: 2020-06-19 | End: 2020-06-21 | Stop reason: HOSPADM

## 2020-06-19 RX ORDER — SODIUM CHLORIDE, SODIUM LACTATE, POTASSIUM CHLORIDE, CALCIUM CHLORIDE 600; 310; 30; 20 MG/100ML; MG/100ML; MG/100ML; MG/100ML
1000 INJECTION, SOLUTION INTRAVENOUS CONTINUOUS
Status: DISCONTINUED | OUTPATIENT
Start: 2020-06-19 | End: 2020-06-19 | Stop reason: HOSPADM

## 2020-06-19 RX ORDER — SODIUM CHLORIDE 0.9 % (FLUSH) 0.9 %
5-40 SYRINGE (ML) INJECTION EVERY 8 HOURS
Status: DISCONTINUED | OUTPATIENT
Start: 2020-06-19 | End: 2020-06-19 | Stop reason: HOSPADM

## 2020-06-19 RX ORDER — DIPHENHYDRAMINE HYDROCHLORIDE 50 MG/ML
25 INJECTION, SOLUTION INTRAMUSCULAR; INTRAVENOUS
Status: ACTIVE | OUTPATIENT
Start: 2020-06-19 | End: 2020-06-20

## 2020-06-19 RX ORDER — SODIUM CHLORIDE 0.9 % (FLUSH) 0.9 %
5-40 SYRINGE (ML) INJECTION AS NEEDED
Status: DISCONTINUED | OUTPATIENT
Start: 2020-06-19 | End: 2020-06-19 | Stop reason: HOSPADM

## 2020-06-19 RX ORDER — IBUPROFEN 800 MG/1
800 TABLET ORAL EVERY 8 HOURS
Status: DISCONTINUED | OUTPATIENT
Start: 2020-06-19 | End: 2020-06-21 | Stop reason: HOSPADM

## 2020-06-19 RX ORDER — DIPHENHYDRAMINE HYDROCHLORIDE 50 MG/ML
12.5 INJECTION, SOLUTION INTRAMUSCULAR; INTRAVENOUS
Status: DISCONTINUED | OUTPATIENT
Start: 2020-06-19 | End: 2020-06-21 | Stop reason: HOSPADM

## 2020-06-19 RX ORDER — SODIUM CHLORIDE 0.9 % (FLUSH) 0.9 %
5-40 SYRINGE (ML) INJECTION AS NEEDED
Status: DISCONTINUED | OUTPATIENT
Start: 2020-06-19 | End: 2020-06-21 | Stop reason: HOSPADM

## 2020-06-19 RX ORDER — EPHEDRINE SULFATE/0.9% NACL/PF 50 MG/5 ML
SYRINGE (ML) INTRAVENOUS AS NEEDED
Status: DISCONTINUED | OUTPATIENT
Start: 2020-06-19 | End: 2020-06-19 | Stop reason: HOSPADM

## 2020-06-19 RX ORDER — HYDROCODONE BITARTRATE AND ACETAMINOPHEN 5; 325 MG/1; MG/1
1 TABLET ORAL
Status: DISCONTINUED | OUTPATIENT
Start: 2020-06-19 | End: 2020-06-21 | Stop reason: HOSPADM

## 2020-06-19 RX ORDER — HYDROCODONE BITARTRATE AND ACETAMINOPHEN 5; 325 MG/1; MG/1
1 TABLET ORAL
Qty: 10 TAB | Refills: 0 | Status: SHIPPED | OUTPATIENT
Start: 2020-06-19 | End: 2020-06-22

## 2020-06-19 RX ADMIN — CEFAZOLIN SODIUM 2 G: 1 INJECTION, POWDER, FOR SOLUTION INTRAMUSCULAR; INTRAVENOUS at 07:17

## 2020-06-19 RX ADMIN — ONDANSETRON HYDROCHLORIDE 4 MG: 2 SOLUTION INTRAMUSCULAR; INTRAVENOUS at 08:26

## 2020-06-19 RX ADMIN — Medication 20 MG: at 07:59

## 2020-06-19 RX ADMIN — KETOROLAC TROMETHAMINE 30 MG: 30 INJECTION, SOLUTION INTRAMUSCULAR at 09:47

## 2020-06-19 RX ADMIN — KETOROLAC TROMETHAMINE 30 MG: 30 INJECTION, SOLUTION INTRAMUSCULAR at 15:49

## 2020-06-19 RX ADMIN — SODIUM CHLORIDE, SODIUM LACTATE, POTASSIUM CHLORIDE, AND CALCIUM CHLORIDE 125 ML/HR: 600; 310; 30; 20 INJECTION, SOLUTION INTRAVENOUS at 14:47

## 2020-06-19 RX ADMIN — KETOROLAC TROMETHAMINE 30 MG: 30 INJECTION, SOLUTION INTRAMUSCULAR at 22:04

## 2020-06-19 RX ADMIN — SODIUM CHLORIDE, SODIUM LACTATE, POTASSIUM CHLORIDE, AND CALCIUM CHLORIDE: 600; 310; 30; 20 INJECTION, SOLUTION INTRAVENOUS at 08:24

## 2020-06-19 RX ADMIN — SODIUM CHLORIDE, SODIUM LACTATE, POTASSIUM CHLORIDE, AND CALCIUM CHLORIDE 125 ML/HR: 600; 310; 30; 20 INJECTION, SOLUTION INTRAVENOUS at 10:55

## 2020-06-19 RX ADMIN — SODIUM CHLORIDE, SODIUM LACTATE, POTASSIUM CHLORIDE, AND CALCIUM CHLORIDE 1000 ML: 600; 310; 30; 20 INJECTION, SOLUTION INTRAVENOUS at 06:11

## 2020-06-19 RX ADMIN — MORPHINE SULFATE 0.25 MG: 0.5 INJECTION, SOLUTION EPIDURAL; INTRATHECAL; INTRAVENOUS at 07:56

## 2020-06-19 RX ADMIN — SODIUM CHLORIDE, SODIUM LACTATE, POTASSIUM CHLORIDE, AND CALCIUM CHLORIDE 125 ML/HR: 600; 310; 30; 20 INJECTION, SOLUTION INTRAVENOUS at 21:05

## 2020-06-19 RX ADMIN — BUPIVACAINE HYDROCHLORIDE 9 MG: 7.5 INJECTION, SOLUTION EPIDURAL; RETROBULBAR at 07:56

## 2020-06-19 RX ADMIN — OXYTOCIN 40 UNITS: 10 INJECTION, SOLUTION INTRAMUSCULAR; INTRAVENOUS at 08:25

## 2020-06-19 NOTE — ANESTHESIA POSTPROCEDURE EVALUATION
Procedure(s):   SECTION. spinal    Anesthesia Post Evaluation        Patient location during evaluation: floor  Level of consciousness: awake  Pain management: adequate  Airway patency: patent  Anesthetic complications: no  Cardiovascular status: acceptable  Respiratory status: acceptable  Hydration status: acceptable  Post anesthesia nausea and vomiting:  none      INITIAL Post-op Vital signs:   Vitals Value Taken Time   /59 2020  9:28 AM   Temp 36.4 °C (97.6 °F) 2020  8:55 AM   Pulse 72 2020  9:28 AM   Resp 15 2020  8:55 AM   SpO2 100 % 2020  9:30 AM   Vitals shown include unvalidated device data.

## 2020-06-19 NOTE — ANESTHESIA PROCEDURE NOTES
Spinal Block    Start time: 6/19/2020 7:50 AM  End time: 6/19/2020 7:57 AM  Performed by: Bridgette Cahudhary CRNA  Authorized by: Bridgette Chaudhary CRNA     Pre-procedure:   Indications: at surgeon's request, procedure for pain and primary anesthetic  Preanesthetic Checklist: patient identified, risks and benefits discussed, anesthesia consent, site marked, patient being monitored and timeout performed    Timeout Time: 07:50          Spinal Block:   Patient Position:  Seated  Prep Region:  Lumbar  Prep: Betadine and patient draped      Location:  L2-3  Technique:  Single shot    Local Dose (mL):  3    Needle:   Needle Type:  Pencil-tip  Needle Gauge:  27 G  Attempts:  1      Events: CSF confirmed, no blood with aspiration and no paresthesia        Assessment:  Insertion:  Uncomplicated  Patient tolerance:  Patient tolerated the procedure well with no immediate complications

## 2020-06-19 NOTE — H&P
----- Message from Tabby Spring MD sent at 5/7/2019  1:17 PM CDT -----  Stress test normal.  Please let patient know.   History & Physical    Name: Judith Owen MRN: [id number]  SSN: [id number]    YOB: 2000  Age: 22 y.o. Sex: female      Subjective:     Estimated Date of Delivery: 7/3/20  OB History    Para Term  AB Living   2 1 1 0 0 1   SAB TAB Ectopic Molar Multiple Live Births   0 0 0 0 0 1      # Outcome Date GA Lbr Kamlesh/2nd Weight Sex Delivery Anes PTL Lv   2 Current            1 Term 17 39w6d 07:23 / 00:14 3.405 kg Adolfo Ramp       Ms. Eaton Ohms admitted with pregnancy at 38w0d for  section due to molly twins (one cephalic, one transverse to maternal R). Prenatal course was complicated by noncompliance, mild anemia, and itching. Please see prenatal records for details. Denies VB, LOF, contractions. Has good FM. Denies cough, shortness of breath, fever, contact with anyone COVID +. Had negative COVID screening on . Past Medical History:   Diagnosis Date    Anemia in pregnancy 2020     No past surgical history on file. Social History     Occupational History    Not on file   Tobacco Use    Smoking status: Never Smoker    Smokeless tobacco: Never Used   Substance and Sexual Activity    Alcohol use: No    Drug use: No    Sexual activity: Yes     Partners: Male     Birth control/protection: None     No family history on file. No Known Allergies  Prior to Admission medications    Medication Sig Start Date End Date Taking? Authorizing Provider   ferrous sulfate (IRON) 325 mg (65 mg iron) EC tablet Take 1 Tab by mouth daily. For anemia. 20  Yes Diane Hwang MD   aspirin 81 mg chewable tablet Take 1 Tab by mouth daily. For preeclampsia prevention 20  Yes Diane Hwang MD   PNV no.75-ssqd-dpzbk acid-dha (PRENATAL DHA+COMPLETE PRENATAL) -970 mg-mcg-mg cmpk Take 1 Tab by mouth daily. Yes Provider, Historical   prenatal vit-calcium-iron-fa (PRENATAL PLUS WITH CALCIUM) 27 mg iron- 1 mg tab Take 1 Tab by mouth daily.  20 Teagan Hernadez MD        Review of Systems: A comprehensive review of systems was negative except for that written in the History of Present Illness. Objective:     Vitals:  Vitals:    20 0602 20 0610   BP: 121/77    Pulse: 81    Resp: 16    Temp: 98.5 °F (36.9 °C)    Weight:  77.1 kg (170 lb)        Physical Exam:  Patient without distress. Heart: Regular rate and rhythm, S1S2 present or without murmur or extra heart sounds  Lung: clear to auscultation throughout lung fields, no wheezes, no rales, no rhonchi and normal respiratory effort  Abdomen: soft, nontender  Fundus: soft and non tender  Lower Extremities:  - Edema No  Membranes:  Intact  A - Fetal Heart Rate: Reactive  A - Baseline: 135 per minute  A - Variability: moderate  A - Accelerations: yes  A - Decelerations: none  B - Fetal Heart Rate: Reactive  B - Baseline: 125 per minute  B - Variability: moderate  B - Accelerations: yes  B - Decelerations: none    On bedside US by Dr. Martinez Prow: one baby cephalic. The other ?breech vs. Transverse (head in middle of maternal abdomen). Prenatal Labs:   Lab Results   Component Value Date/Time    Rubella, External immune 2017    GrBStrep, External positive 2017    HBsAg, External negative 2017    HIV, External non-reactive 2017    Gonorrhea, External negative 2017    Chlamydia, External negative 2017         Impression/Plan:     Ms. Tita Santo is a  admitted with pregnancy at 38w0d for  section due to molly twins (one cephalic, one transverse to maternal R). Plan:    1. Admit for  section. 2. Group B Strep was negative. 3. COVID screening negative on .  4. Molly twin IUP: EFW 57th and 46th% on , one cephalic, one transverse to maternal R. O+, Ab screen neg, Hgb fract wnl, VZV/Rubella immune, HIV/HepBsAg/Tpall neg, G/C neg. Pap NILM. S/p tdap. GTT wnl.  5. Noncompliance: missed several appointments, UDS neg.   6. Anemia: Hgb this AM 12.1. On iron daily. 7. Itching: BA and CMP wnl. Discussed the risks of surgery including the risks of bleeding, infection, deep vein thrombosis, and surgical injuries to internal organs including but not limited to the bowels, bladder, rectum, and female reproductive organs. The patient understands the risks; any and all questions were answered to the patient's satisfaction. Patient discussed with Dr. Jose J Collins.     Signed By:  Maxwell Chung DO    Family Medicine Resident

## 2020-06-19 NOTE — PROGRESS NOTES
06/19/20 11:28 AM  CM met with MOB and her /FOB [place] ([phone]) to complete initial assessment and to begin discharge planning. Demographics were reviewed and confirmed. The couple lives in [place] and also have a 1year old son. Family supports available to assist as needed and include MOB's parents. [facility] will provide medical follow up for the babies. Patient has car seat, crib, clothing, and other necessary supplies for both babies. [facility] has completed the Medicaid screening with MOB this morning for MOB and both babies. Discussed [facility] services, she does not currently have but is interested; [facility]. MOB is bottlefeeding formula. Denied any additional needs at this time. FOB will provide transportation home at discharge.   Care Management Interventions  PCP Verified by CM: Yes([facility])  Mode of Transport at Discharge: Self  Transition of Care Consult (CM Consult): Discharge Planning  Current Support Network: Lives with Spouse, Family Lives Nearby  Confirm Follow Up Transport: Family  Discharge Location  Discharge Placement: Home with family assistance  CRISTOFER Coburn

## 2020-06-19 NOTE — L&D DELIVERY NOTE
Delivery Summary    Patient: Kush Arreguin MRN: [id number]  SSN: [id number]    YOB: 2000  Age: 22 y.o. Sex: female        Information for the patient's :  Tita Santo, Male Rudy Agrawal [[id number]]       Labor Events:    Labor: No    Steroids:     Cervical Ripening Date/Time:       Cervical Ripening Type: None   Antibiotics During Labor: No   Rupture Identifier: Sac 1    Rupture Date/Time: 2020 8:23 AM   Rupture Type: AROM   Amniotic Fluid Volume: Moderate    Amniotic Fluid Description: Clear    Amniotic Fluid Odor: None    Induction: None       Induction Date/Time:        Indications for Induction:      Augmentation: None   Augmentation Date/Time:      Indications for Augmentation:     Labor complications: None       Additional complications:        Delivery Events:  Indications For Episiotomy:     Episiotomy: None   Perineal Laceration(s): None   Repaired:     Periurethral Laceration Location:      Repaired:     Labial Laceration Location:     Repaired:     Sulcal Laceration Location:     Repaired:     Vaginal Laceration Location:     Repaired:     Cervical Laceration Location:     Repaired:     Repair Suture: None   Number of Repair Packets:     Estimated Blood Loss (ml):  ml   Quantitaive Blood Loss (ml):             Delivery Date: 2020    Delivery Time: 8:23 AM   Delivery Type: , Low Transverse     Details    Trial of Labor: No   Primary/Repeat: Primary   Priority: Routine   Indications:  Multiple Gestation       Sex:  Male     Gestational Age: 42w0d  Delivery Clinician:  Chucky Holter  Living Status: Living   Delivery Location: OR            APGARS  One minute Five minutes Ten minutes   Skin color: 1   1        Heart rate: 2   2        Grimace: 2   2        Muscle tone: 2   2        Breathin   2        Totals: 9   9          Presentation: Vertex    Position:        Resuscitation Method:  Suctioning-bulb; Tactile Stimulation     Meconium Stained: None Cord Information: 3 Vessels  Complications: None  Cord around:    Delayed cord clamping? No  Cord clamped date/time:   Disposition of Cord Blood: Lab    Blood Gases Sent?: No    Placenta:  Date/Time: 2020  8:24 AM  Removal: Manual Removal      Appearance: Normal      Measurements:  Birth Weight: 2.78 kg      Birth Length: 47 cm      Head Circumference: 33.5 cm      Chest Circumference: 33.5 cm     Abdominal Girth: 31 cm    Other Providers:   THI LIRA;JANA MILLER;JANE CRAIG;JON MONAHAN;HAYES HINOJOSA;WARREN TALAMANTES;MARGA DEWITT;OC BENAVIDES, Obstetrician;Primary Nurse;Primary [place] Nurse;Scrub Tech;Scrub Tech;Crna; Charge Nurse;Resident         Information for the patient's :  Nahed Guzmán [[id number]]       Labor Events:    Labor: No    Steroids: None   Cervical Ripening Date/Time:       Cervical Ripening Type: None   Antibiotics During Labor: No   Rupture Identifier: Sac 1    Rupture Date/Time: 2020 8:23 AM   Rupture Type: AROM   Amniotic Fluid Volume:  Moderate    Amniotic Fluid Description: Clear    Amniotic Fluid Odor: None    Induction: None       Induction Date/Time:        Indications for Induction:      Augmentation: None   Augmentation Date/Time:      Indications for Augmentation:     Labor complications: None       Additional complications:        Delivery Events:  Indications For Episiotomy:     Episiotomy: None   Perineal Laceration(s): None   Repaired:     Periurethral Laceration Location:      Repaired:     Labial Laceration Location:     Repaired:     Sulcal Laceration Location:     Repaired:     Vaginal Laceration Location:     Repaired:     Cervical Laceration Location:     Repaired:     Repair Suture: None   Number of Repair Packets:     Estimated Blood Loss (ml):  ml   Quantitaive Blood Loss (ml):             Delivery Date: 2020    Delivery Time: 8:24 AM   Delivery Type: , Low Transverse     Details Trial of Labor: No   Primary/Repeat: Primary   Priority: Routine   Indications:  Multiple Gestation       Sex:  Male     Gestational Age: 42w0d  Delivery Clinician:  Syl Parker  Living Status: Living   Delivery Location: OR            APGARS  One minute Five minutes Ten minutes   Skin color: 1   1        Heart rate: 2   2        Grimace: 2   2        Muscle tone: 2   2        Breathin   2        Totals: 9   9          Presentation: Breech    Position:        Resuscitation Method:  Suctioning-bulb; Tactile Stimulation     Meconium Stained: None      Cord Information: 3 Vessels  Complications: None  Cord around:    Delayed cord clamping? No  Cord clamped date/time:   Disposition of Cord Blood: Lab    Blood Gases Sent?: No    Placenta:  Date/Time: 2020  8:24 AM  Removal: Manual Removal      Appearance: Normal     [place] Measurements:  Birth Weight: 2.53 kg      Birth Length: 45.7 cm      Head Circumference: 31.5 cm      Chest Circumference: 30 cm     Abdominal Girth: 29.5 cm    Other Providers:   THI LIRA;JANA MILLER;MUNDO WATKINS;WARREN TALAMANTES;OC BENAVIDES;JON MONAHAN;HAYES HINOJOSA;MARGA DEWITT, Obstetrician;Primary Nurse;Primary  Nurse;Crna;Resident;Scrub Tech;Scrub Tech;Charge Nurse             Group B Strep:   Lab Results   Component Value Date/Time    Darieltrep, External Negative 06/10/2020     Information for the patient's :  Sabina Camacho, Male Lacretia Albino [[id number]]   No results found for: ABORH, PCTABR, PCTDIG, BILI, ABORHEXT, [address]  Information for the patient's :  Sabina Camacho, Male Josetta Pastures [[id number]]   No results found for: ABORH, PCTABR, PCTDIG, BILI, ABORHEXT, ABORH    No results for input(s): PCO2CB, PO2CB, HCO3I, SO2I, IBD, PTEMPI, SPECTI, PHICB, ISITE, IDEV, IALLEN in the last 72 hours.

## 2020-06-19 NOTE — DISCHARGE SUMMARY
Obstetrical Discharge Summary     Name: Earline Marrufo MRN: [id number]  SSN: [id number]    YOB: 2000  Age: 22 y.o. Sex: female      Admit Date: 2020    Discharge Date: 2020     Admitting Physician: Aneudy Burns MD     Attending Physician:  Aubrie att. providers found     Admission Diagnoses: Dichorionic diamniotic twin pregnancy in third trimester [O30.043]  Pregnant [Z34.90]    Discharge Diagnoses:   Information for the patient's :  Karyn Cadet, Male Peg Baptiste [[id number]]   Delivery of a 2.78 kg male infant via , Low Transverse on 2020 at 8:23 AM  by Aneudy Burns. Apgars were 9  and 9 . Information for the patient's :  Karyn Cadet, Male Tino Henry [[id number]]   Delivery of a 2.53 kg male infant via , Low Transverse on 2020 at 8:24 AM  by Aneudy Burns. Apgars were 9  and 9 . Additional Diagnoses:   Hospital Problems  Date Reviewed: 2020          Codes Class Noted POA    Pregnant ICD-10-CM: Z34.90  ICD-9-CM: V22.2  2020 Unknown             Lab Results   Component Value Date/Time    Rubella, External Immune 2020    GrBStrep, External Negative 06/10/2020       Hospital Course: Normal hospital course following the delivery. Disposition: Home  Condition: Good    Patient Instructions:   Discharge Medication List as of 2020  9:37 AM      START taking these medications    Details   docusate sodium (COLACE) 100 mg capsule Take 1 Cap by mouth daily as needed for Constipation for up to 20 days. , Normal, Disp-20 Cap, R-0      HYDROcodone-acetaminophen (NORCO) 5-325 mg per tablet Take 1 Tab by mouth every four (4) hours as needed for Pain for up to 3 days.  Max Daily Amount: 6 Tabs., Normal, Disp-10 Tab, R-0      ibuprofen (MOTRIN) 800 mg tablet Take 1 Tab by mouth every eight (8) hours as needed for Pain., Normal, Disp-30 Tab, R-0         CONTINUE these medications which have NOT CHANGED    Details   ferrous sulfate (IRON) 325 mg (65 mg iron) EC tablet Take 1 Tab by mouth daily. For anemia., Normal, Disp-90 Tab, R-1      PNV no.24-iron-folic acid-dha (PRENATAL DHA+COMPLETE PRENATAL) -300 mg-mcg-mg cmpk Take 1 Tab by mouth daily. , Historical Med         STOP taking these medications       prenatal vit-calcium-iron-fa (PRENATAL PLUS WITH CALCIUM) 27 mg iron- 1 mg tab Comments:   Reason for Stopping:         aspirin 81 mg chewable tablet Comments:   Reason for Stopping:             Reference my discharge instructions. Follow-up Information     Follow up With Specialties Details Why Radha Casas MD Obstetrics & Gynecology, Gynecology, Obstetrics Go on 2020 @1:10PM for your postpartum visit.  This is at [facility] which is on the 3rd floor of the medical office building at the hospital. [address]  [facility] [place] [address]      Justo MARCIAL MD  Go on 2020 @ 1:40 and 2 PM for  weight checks, come 30 minutes early [address]  [phone]              Signed By:  Fabiola Salazar DO     2020

## 2020-06-19 NOTE — OP NOTES
Operative Note    Name: Paulita Blizzard   Medical Record Number: [id number]   YOB: 2000  Today's Date: 2020      Pre-operative Diagnosis: Dichorionic diamniotic twin pregnancy in third trimester [O30.043]    Post-operative Diagnosis: TLBMI x 2 V/Br    Operation: low transverse  section Procedure(s):   SECTION    Surgeon(s):  Erich Stevens MD    Anesthesia: Spinal    Prophylactic Antibiotics: Ancef  DVT Prophylaxis: Sequential Compression Devices  EBL: 750cc       Fetal Description: multiple gestation 2     Birth Information:   Information for the patient's :  Delma Daniels, Male Rama Jacobson [[id number]]   Delivery of a 6 lb 2.1 oz (2.78 kg) male infant on 2020 at 8:23 AM. Apgars were 9  and 9 . Umbilical Cord: 3 Vessels     Umbilical Cord Events: None     Placenta: Manual Removal removal with Normal appearance. Amniotic Fluid Volume: Moderate     Amniotic Fluid Description:  Clear    Information for the patient's :  Avila Carroll Male Angel Petjovanni [[id number]]   Delivery of a 5 lb 9.2 oz (2.53 kg) male infant on 2020 at 8:24 AM. Apgars were 9  and 9 . Umbilical Cord: 3 Vessels     Umbilical Cord Events: None     Placenta: Manual Removal removal with Normal appearance. Amniotic Fluid Volume: Moderate     Amniotic Fluid Description:  Clear        Umbilical Cord: 3 vessels present    Placenta:  manual removal    Specimens: placenta  Implants: none  Circ-1: Mark Rausch RN  Scrub Tech-1: Jaems Hernandez  Scrub Tech-2: Kat RASHID             Complications:  none    Procedure Detail:      After proper patient identification and consent, the patient was taken to the operating room, where spinal anesthesia was administered and found to be adequate. Acosta catheter had been placed using sterile technique. The patient was prepped and draped in the normal sterile fashion. The abdomen was entered using the Pfannenstiel technique.  The peritoneum was entered bluntely well superior to the bladder without any apparent injury. An Guru retractor was placed. Palpation revealed no bowel below the retractor. The bladder flap was created without difficulty. A low transverse uterine incision was made with the scalpel and extended with blunt finger dissection. Amniotomy was performed and the fluid was medium amount clear. The babys head was then delivered atraumatically. The nose and mouth were suctioned. The cord was clamped and cut and the baby was handed off to Nursing staff in attendance. Baby B was footling breech. The membranes were ruptured and the infant delivered by breech extraction. The mouth and nose were suctioned and the baby was handed off to awaiting nurse. The placentas were manually extracted. The uterus was wiped clean with a moist lap pad and cleared of all clots and debris. The uterine incision was closed in 2 layers, first with a running locked suture of 0-monocryl, then with an imbricated layer. Adequate hemostasis was noted. Both tubes and ovaries appeared normal. The pericolic gutters were then lavaged clean with normal saline. Good hemostasis was again reassured The Guru retractor was removed. The fascia was closed with stratifix symmetric in a running fashion. Good hemostasis was assured. The incision was lavaged clean and small bleeders were coagulated with the bovie. The skin was closed with absorbable staples. The patient tolerated the procedure well. Sponge, lap, and needle counts were correct times three and the patient and baby were taken to recovery/postpartum room in stable condition.     Analy Jackson MD  June 19, 2020  4:59 PM

## 2020-06-19 NOTE — PROGRESS NOTES
7029 Received report from Nettie Martines RN. Patient resting in the bed.     9356 Dr Raymundo Tracy at the bedside assessing the patient and getting consent for Anesthesia. 9961 TRANSFER - OUT REPORT:    Verbal report given to Paul Hermosillo RN(name) on Edgard Camara  being transferred to MIU(unit) for routine progression of care       Report consisted of patients Situation, Background, Assessment and   Recommendations(SBAR). Information from the following report(s) SBAR, Kardex and MAR was reviewed with the receiving nurse. Lines:   Peripheral IV 06/19/20 Left;Posterior Hand (Active)   Site Assessment Clean, dry, & intact 6/19/2020  8:55 AM   Phlebitis Assessment 0 6/19/2020  8:55 AM   Infiltration Assessment 0 6/19/2020  8:55 AM   Dressing Status Clean, dry, & intact 6/19/2020  8:55 AM   Dressing Type Tape;Transparent 6/19/2020  8:55 AM   Hub Color/Line Status Pink; Infusing 6/19/2020  7:27 AM   Action Taken Blood drawn 6/19/2020  6:08 AM   Alcohol Cap Used Yes 6/19/2020  6:08 AM        Opportunity for questions and clarification was provided.       Patient transported with:   Registered Nurse

## 2020-06-19 NOTE — PROGRESS NOTES
6:24 AM  Patient admitted for a primary  for twins. Patient reports no interaction with a COVID positive person and tested negative on .  7:14 AM  SBAR report given to Jose Daniel Desir RN. Care of the patient turned over at this time.

## 2020-06-19 NOTE — ANESTHESIA PREPROCEDURE EVALUATION
Relevant Problems   No relevant active problems       Anesthetic History   No history of anesthetic complications            Review of Systems / Medical History  Patient summary reviewed, nursing notes reviewed and pertinent labs reviewed    Pulmonary  Within defined limits                 Neuro/Psych   Within defined limits           Cardiovascular  Within defined limits                     GI/Hepatic/Renal  Within defined limits              Endo/Other  Within defined limits           Other Findings                   Anesthetic Plan    ASA: 2  Anesthesia type: spinal            Anesthetic plan and risks discussed with: Patient

## 2020-06-20 LAB
BASOPHILS # BLD: 0 K/UL (ref 0–0.1)
BASOPHILS NFR BLD: 0 % (ref 0–1)
DIFFERENTIAL METHOD BLD: ABNORMAL
EOSINOPHIL # BLD: 0.1 K/UL (ref 0–0.4)
EOSINOPHIL NFR BLD: 1 % (ref 0–7)
ERYTHROCYTE [DISTWIDTH] IN BLOOD BY AUTOMATED COUNT: 15.7 % (ref 11.5–14.5)
HCT VFR BLD AUTO: 26 % (ref 35–47)
HGB BLD-MCNC: 8.5 G/DL (ref 11.5–16)
IMM GRANULOCYTES # BLD AUTO: 0.1 K/UL (ref 0–0.04)
IMM GRANULOCYTES NFR BLD AUTO: 1 % (ref 0–0.5)
LYMPHOCYTES # BLD: 2.4 K/UL (ref 0.8–3.5)
LYMPHOCYTES NFR BLD: 25 % (ref 12–49)
MCH RBC QN AUTO: 30.6 PG (ref 26–34)
MCHC RBC AUTO-ENTMCNC: 32.7 G/DL (ref 30–36.5)
MCV RBC AUTO: 93.5 FL (ref 80–99)
MONOCYTES # BLD: 0.6 K/UL (ref 0–1)
MONOCYTES NFR BLD: 6 % (ref 5–13)
NEUTS SEG # BLD: 6.5 K/UL (ref 1.8–8)
NEUTS SEG NFR BLD: 67 % (ref 32–75)
NRBC # BLD: 0 K/UL (ref 0–0.01)
NRBC BLD-RTO: 0 PER 100 WBC
PLATELET # BLD AUTO: 114 K/UL (ref 150–400)
PMV BLD AUTO: 12.9 FL (ref 8.9–12.9)
RBC # BLD AUTO: 2.78 M/UL (ref 3.8–5.2)
WBC # BLD AUTO: 9.7 K/UL (ref 3.6–11)

## 2020-06-20 PROCEDURE — 74011250636 HC RX REV CODE- 250/636: Performed by: ANESTHESIOLOGY

## 2020-06-20 PROCEDURE — 36415 COLL VENOUS BLD VENIPUNCTURE: CPT

## 2020-06-20 PROCEDURE — 85025 COMPLETE CBC W/AUTO DIFF WBC: CPT

## 2020-06-20 PROCEDURE — 74011250637 HC RX REV CODE- 250/637: Performed by: STUDENT IN AN ORGANIZED HEALTH CARE EDUCATION/TRAINING PROGRAM

## 2020-06-20 PROCEDURE — 65270000029 HC RM PRIVATE

## 2020-06-20 RX ADMIN — HYDROCODONE BITARTRATE AND ACETAMINOPHEN 1 TABLET: 5; 325 TABLET ORAL at 02:21

## 2020-06-20 RX ADMIN — Medication 10 ML: at 06:13

## 2020-06-20 RX ADMIN — IBUPROFEN 800 MG: 800 TABLET ORAL at 23:42

## 2020-06-20 RX ADMIN — DOCUSATE SODIUM 100 MG: 100 CAPSULE, LIQUID FILLED ORAL at 11:24

## 2020-06-20 RX ADMIN — HYDROCODONE BITARTRATE AND ACETAMINOPHEN 1 TABLET: 5; 325 TABLET ORAL at 11:24

## 2020-06-20 RX ADMIN — KETOROLAC TROMETHAMINE 30 MG: 30 INJECTION, SOLUTION INTRAMUSCULAR at 06:13

## 2020-06-20 RX ADMIN — DOCUSATE SODIUM 100 MG: 100 CAPSULE, LIQUID FILLED ORAL at 23:42

## 2020-06-20 RX ADMIN — HYDROCODONE BITARTRATE AND ACETAMINOPHEN 1 TABLET: 5; 325 TABLET ORAL at 15:41

## 2020-06-20 RX ADMIN — IBUPROFEN 800 MG: 800 TABLET ORAL at 15:35

## 2020-06-20 NOTE — PROGRESS NOTES
7:37 PM  Bedside shift change report given to Glenroy Acevedo (oncoming nurse) by Liam Cevallos RN (offgoing nurse). Report included the following information SBAR, Kardex, Intake/Output, MAR and Recent Results.

## 2020-06-20 NOTE — PROGRESS NOTES
2015: RN at bedside attempting to get patient out of bed, patient states her \"left leg is still numb and tingling. \" RN suggested we wait so she does not fall. Will reassess in the hour. 2115: RN at bedside, assisting mother with feedings, will allow MOB and FOB to feed babies, then get mother up when time for pain medication closer to 10pm, mother agreed with POC.    2250: RN at bedside to assist patient to the bathroom, Patient assisted to the side of the bed and stood and refused to go any further at this time, RN changed pad and assisted her back to bed, offered narcotic, but patient wants to hold off for now, will reassess for pain and attempt to get patient ambulating to the bathroom in the next two hours when babies are due to eat again, so patient may rest.    2330: Patient resting in bed, infants in crib and FOB sleep in chair. 0030:  Patient resting in bed, infants in crib and FOB sleep in chair. 0120: RN at bedside, Patient and FOB getting ready to feed infants, advised to call out when done so patient can get out of bed and walk to the bathroom, patient verbalized understanding.   [id number] Patient in bed resting, infants in nursery with RN    6735: Patient ambulated in room

## 2020-06-20 NOTE — ROUTINE PROCESS
Bedside and Verbal shift change report given to Katalina Young RN  (oncoming nurse) by Isamar Delgado RN (offgoing nurse). Report included the following information SBAR, Kardex, Intake/Output, MAR and Recent Results.

## 2020-06-20 NOTE — PROGRESS NOTES
Post-Operative Day Number 1 Progress Note    Patient doing well post-op day 1 from  delivery without significant complaints. Pain controlled on current medication. Acosta out, voiding without difficulty, normal lochia. Tolerating regular diet without nausea or vomiting.  +flatus  Twins. Bottle feeding    Vitals:    Patient Vitals for the past 8 hrs:   BP Temp Pulse Resp   20 0640 117/60 98.1 °F (36.7 °C) 65 16     Temp (24hrs), Av.4 °F (36.9 °C), Min:98.1 °F (36.7 °C), Max:98.9 °F (37.2 °C)                     Intake/Output Summary (Last 24 hours) at 2020 1128  Last data filed at 2020 1122  Gross per 24 hour   Intake    Output 1930 ml   Net -1930 ml         Exam:  Patient without distress               Lungs:  CTA bilaterally               CV:  Regular rate and rhythm               Abdomen soft, nondistended, normal bowel sounds               Uterus:  fundus firm at level of umbilicus, nontender. Incision:  Intact, with no erythema, exudate, induration. Lower extremities are negative for cords or tenderness; no swelling. Labs:   Recent Results (from the past 24 hour(s))   CBC WITH AUTOMATED DIFF    Collection Time: 20  2:38 AM   Result Value Ref Range    WBC 9.7 3.6 - 11.0 K/uL    RBC 2.78 (L) 3.80 - 5.20 M/uL    HGB 8.5 (L) 11.5 - 16.0 g/dL    HCT 26.0 (L) 35.0 - 47.0 %    MCV 93.5 80.0 - 99.0 FL    MCH 30.6 26.0 - 34.0 PG    MCHC 32.7 30.0 - 36.5 g/dL    RDW 15.7 (H) 11.5 - 14.5 %    PLATELET 601 (L) 037 - 400 K/uL    MPV 12.9 8.9 - 12.9 FL    NRBC 0.0 0  WBC    ABSOLUTE NRBC 0.00 0.00 - 0.01 K/uL    NEUTROPHILS 67 32 - 75 %    LYMPHOCYTES 25 12 - 49 %    MONOCYTES 6 5 - 13 %    EOSINOPHILS 1 0 - 7 %    BASOPHILS 0 0 - 1 %    IMMATURE GRANULOCYTES 1 (H) 0.0 - 0.5 %    ABS. NEUTROPHILS 6.5 1.8 - 8.0 K/UL    ABS. LYMPHOCYTES 2.4 0.8 - 3.5 K/UL    ABS. MONOCYTES 0.6 0.0 - 1.0 K/UL    ABS.  EOSINOPHILS 0.1 0.0 - 0.4 K/UL    ABS. BASOPHILS 0.0 0.0 - 0.1 K/UL    ABS. IMM. GRANS. 0.1 (H) 0.00 - 0.04 K/UL    DF AUTOMATED         Lab Results   Component Value Date/Time    Rubella, External Immune 02/18/2020    GrBStrep, External Negative 06/10/2020    HBsAg, External Negative 02/18/2020    HIV, External Negative 02/18/2020    Gonorrhea, External Negative 02/18/2020    Chlamydia, External Negative 02/18/2020       Assessment and Plan:  Postoperative day #1S/P C/S. Doing well. - Continue routine post-op care and maternal education.      - encourage ambulation

## 2020-06-21 VITALS
DIASTOLIC BLOOD PRESSURE: 81 MMHG | BODY MASS INDEX: 33.2 KG/M2 | HEART RATE: 78 BPM | TEMPERATURE: 98.5 F | WEIGHT: 170 LBS | RESPIRATION RATE: 16 BRPM | SYSTOLIC BLOOD PRESSURE: 129 MMHG | OXYGEN SATURATION: 98 %

## 2020-06-21 PROCEDURE — 74011250637 HC RX REV CODE- 250/637: Performed by: STUDENT IN AN ORGANIZED HEALTH CARE EDUCATION/TRAINING PROGRAM

## 2020-06-21 RX ADMIN — IBUPROFEN 800 MG: 800 TABLET ORAL at 07:18

## 2020-06-21 NOTE — ROUTINE PROCESS
Bedside and Verbal shift change report given to Carmen Tillman RN (oncoming nurse) by Mary Oocnnell RN  (offgoing nurse). Report included the following information SBAR, Kardex, Intake/Output, MAR and Recent Results.

## 2020-06-21 NOTE — ROUTINE PROCESS
Patient off unit in stable condition via wheelchair with RN for discharge home per Dr. Alexander Miranda. Patient is to follow up in 4-6 weeks and is aware. Prescriptions given to patient. Patient denies any headache, dizziness, nausea/vomitting, or pain at this time. Infant in car seat with mother.

## 2020-07-30 ENCOUNTER — OFFICE VISIT (OUTPATIENT)
Dept: OBGYN CLINIC | Age: 20
End: 2020-07-30

## 2020-07-30 VITALS
BODY MASS INDEX: 24.94 KG/M2 | DIASTOLIC BLOOD PRESSURE: 71 MMHG | SYSTOLIC BLOOD PRESSURE: 119 MMHG | HEIGHT: 60 IN | WEIGHT: 127 LBS

## 2020-07-30 NOTE — PROGRESS NOTES
Postpartum evaluation    Sera Zhou is a 22 y.o. female who presents for a postpartum exam.     She is now six weeks post primary  section. She delivered twins on 20    Her baby is doing well. She has had vaginal bleeding since delivery. First couple weeks were light. States she started bleeding heavy with clots on 20, bleeding today. She has had the following significant problems since her delivery: none    The patient is bottle feeding without difficulty. The patient would like to use discuss options for birth control. She is currently taking: no medications. She is due for her next AE in 4 months.      Visit Vitals  /71   Ht 5' (1.524 m)   Wt 127 lb (57.6 kg)   BMI 24.80 kg/m²       PHYSICAL EXAMINATION    Constitutional  · Appearance: well-nourished, well developed, alert, in no acute distress    HENT  · Head and Face: appears normal    Neck  · Inspection/Palpation: normal appearance, no masses or tenderness  · Lymph Nodes: no lymphadenopathy present  · Thyroid: gland size normal, nontender, no nodules or masses present on palpation    Breasts  · Inspection of Breasts: breasts symmetrical, no skin changes, no discharge present, nipple appearance normal, no skin retraction present  · Palpation of Breasts and Axillae: no masses present on palpation, no breast tenderness  · Axillary Lymph Nodes: no lymphadenopathy present    Gastrointestinal  · Abdominal Examination: abdomen non-tender to palpation, normal bowel sounds, no masses present  · Liver and spleen: no hepatomegaly present, spleen not palpable  · Hernias: no hernias identified    Genitourinary  · External Genitalia: normal appearance for age, no discharge present, no tenderness present, no inflammatory lesions present, no masses present, no atrophy present  · Vagina: normal vaginal vault without central or paravaginal defects, no discharge present, no inflammatory lesions present, no masses present  · Bladder: non-tender to palpation  · Urethra: appears normal  · Cervix: normal   · Uterus: normal size, shape and consistency  · Adnexa: no adnexal tenderness present, no adnexal masses present  · Perineum: perineum within normal limits, no evidence of trauma, no rashes or skin lesions present  · Anus: anus within normal limits, no hemorrhoids present  · Inguinal Lymph Nodes: no lymphadenopathy present    Skin  · General Inspection: no rash, no lesions identified    Neurologic/Psychiatric  · Mental Status:  · Orientation: grossly oriented to person, place and time  · Mood and Affect: mood normal, affect appropriate    Assessment:  Normal postpartum check    Plan:  RTO for AE.

## 2022-03-18 PROBLEM — Z37.9 NORMAL LABOR: Status: ACTIVE | Noted: 2017-03-08

## 2022-03-19 PROBLEM — O09.30 LATE PRENATAL CARE: Status: ACTIVE | Noted: 2017-01-03

## 2022-03-19 PROBLEM — O09.619 SUPERVISION OF HIGH-RISK PREGNANCY OF YOUNG PRIMIGRAVIDA: Status: ACTIVE | Noted: 2017-01-03

## 2022-03-19 PROBLEM — O99.019 ANEMIA IN PREGNANCY: Status: ACTIVE | Noted: 2020-05-14

## 2022-03-19 PROBLEM — O99.820 GBS (GROUP B STREPTOCOCCUS CARRIER), +RV CULTURE, CURRENTLY PREGNANT: Status: ACTIVE | Noted: 2017-03-03

## 2022-03-19 PROBLEM — O30.049 DICHORIONIC DIAMNIOTIC TWIN GESTATION: Status: ACTIVE | Noted: 2020-05-14

## 2022-03-20 PROBLEM — Z34.90 PREGNANT: Status: ACTIVE | Noted: 2020-06-19

## 2023-05-24 RX ORDER — LANOLIN ALCOHOL/MO/W.PET/CERES
325 CREAM (GRAM) TOPICAL DAILY
COMMUNITY
Start: 2020-04-26

## 2023-05-24 RX ORDER — IBUPROFEN 800 MG/1
800 TABLET ORAL EVERY 8 HOURS PRN
COMMUNITY
Start: 2020-06-19

## 2023-06-26 ENCOUNTER — APPOINTMENT (OUTPATIENT)
Facility: HOSPITAL | Age: 23
End: 2023-06-26

## 2023-06-26 ENCOUNTER — HOSPITAL ENCOUNTER (EMERGENCY)
Facility: HOSPITAL | Age: 23
Discharge: HOME | End: 2023-06-26
Attending: EMERGENCY MEDICINE

## 2023-06-26 VITALS
RESPIRATION RATE: 17 BRPM | DIASTOLIC BLOOD PRESSURE: 71 MMHG | BODY MASS INDEX: 29.45 KG/M2 | OXYGEN SATURATION: 99 % | HEIGHT: 60 IN | TEMPERATURE: 98.3 F | HEART RATE: 67 BPM | SYSTOLIC BLOOD PRESSURE: 120 MMHG | WEIGHT: 150 LBS

## 2023-06-26 DIAGNOSIS — S91.332A PUNCTURE WOUND OF LEFT FOOT, INITIAL ENCOUNTER: Primary | ICD-10-CM

## 2023-06-26 PROCEDURE — 90714 TD VACC NO PRESV 7 YRS+ IM: CPT

## 2023-06-26 PROCEDURE — 73620 X-RAY EXAM OF FOOT: CPT

## 2023-06-26 PROCEDURE — 90471 IMMUNIZATION ADMIN: CPT

## 2023-06-26 PROCEDURE — 99284 EMERGENCY DEPT VISIT MOD MDM: CPT

## 2023-06-26 PROCEDURE — 6360000002 HC RX W HCPCS

## 2023-06-26 RX ORDER — TETANUS AND DIPHTHERIA TOXOIDS ADSORBED 2; 2 [LF]/.5ML; [LF]/.5ML
0.5 INJECTION INTRAMUSCULAR ONCE
Status: COMPLETED | OUTPATIENT
Start: 2023-06-26 | End: 2023-06-26

## 2023-06-26 RX ORDER — CIPROFLOXACIN 500 MG/1
500 TABLET, FILM COATED ORAL 2 TIMES DAILY
Qty: 10 TABLET | Refills: 0 | Status: SHIPPED | OUTPATIENT
Start: 2023-06-26 | End: 2023-07-01

## 2023-06-26 RX ORDER — CEPHALEXIN 500 MG/1
500 CAPSULE ORAL 2 TIMES DAILY
Qty: 10 CAPSULE | Refills: 0 | Status: SHIPPED | OUTPATIENT
Start: 2023-06-26 | End: 2023-07-01

## 2023-06-26 RX ADMIN — TETANUS AND DIPHTHERIA TOXOIDS ADSORBED 0.5 ML: 2; 2 INJECTION INTRAMUSCULAR at 16:39

## 2023-06-26 ASSESSMENT — PAIN SCALES - GENERAL: PAINLEVEL_OUTOF10: 6

## 2023-06-26 ASSESSMENT — PAIN DESCRIPTION - ORIENTATION: ORIENTATION: LEFT

## 2023-06-26 ASSESSMENT — PAIN - FUNCTIONAL ASSESSMENT: PAIN_FUNCTIONAL_ASSESSMENT: 0-10

## 2023-06-26 ASSESSMENT — PAIN DESCRIPTION - LOCATION: LOCATION: FOOT

## 2024-02-16 NOTE — PROGRESS NOTES
Return OB Visit       Subjective:   Camelia Navarrete 23 y.o.   JAZMINE: 3/9/2017, by Ultrasound  GA:  35w1d. No complaints at this time. Diet: well balanced, healthy   Water intake: adequalte   Prenatal Vitamins: taking     She is feeling her baby move. She denies vaginal bleeding, discharge or loss of fluid. She denies nausea, vomiting, severe abdominal pain or cramping. She denies dysuria. She denies headaches, dizziness or vision changes. She denies excessive swelling of extremities. Past Medical History - Reviewed today  Patient Active Problem List   Diagnosis Code    Supervision of high-risk pregnancy of young primigravida O12.200    Late prenatal care O09.30         Medications - Reviewed today  Current Outpatient Prescriptions   Medication Sig Dispense Refill    PNV no.24-iron-folic acid-dha (PRENATAL DHA+COMPLETE PRENATAL) -300 mg-mcg-mg cmpk Take 1 Tab by mouth daily. Allergies - Reviewed today  No Known Allergies      Family History - Reviewed today  No family history on file. Social History - Reviewed today  Social History     Social History    Marital status: SINGLE     Spouse name: N/A    Number of children: N/A    Years of education: N/A     Occupational History    Not on file.      Social History Main Topics    Smoking status: Never Smoker    Smokeless tobacco: Not on file    Alcohol use No    Drug use: No    Sexual activity: Yes     Partners: Male     Birth control/ protection: None     Other Topics Concern    Not on file     Social History Narrative         Health Maintenance - Reviewed today   Immunizations:     -Influenza: 2017      -Tdap: 2017       Objective:     Visit Vitals    /76 (BP 1 Location: Left arm, BP Patient Position: Sitting)    Pulse 77    Temp 97.9 °F (36.6 °C) (Oral)    Resp 16    Ht 5' (1.524 m)    Wt 138 lb (62.6 kg)    LMP 2016 (Within Weeks)    SpO2 98%    BMI 26.95 kg/m2       Weight gain 7lb in WFKR=122/65 mmhg, Resp=13 B/min, EtCO2=8 mmHg, Apnea=14 Seconds 2w2d       Physical Exam:  GENERAL APPEARANCE: alert, well appearing, in no apparent distress  LUNGS: clear to auscultation, no wheezes, rales or rhonchi, symmetric air entry  HEART: regular rate and rhythm, no murmurs  ABDOMEN: FHT present, 145 bpm  BACK: no CVA tenderness  UTERUS: gravid, 33 cm  EXTREMITIES: no redness or tenderness in the calves or thighs, no edema    UA +1LE , neg nitrite    Assessment       ICD-10-CM ICD-9-CM    1. Supervision of high-risk pregnancy of young primigravida O12.200 V23.83 AMB POC URINALYSIS DIP STICK AUTO W/O MICRO     SIUP at  35w1d   - GTT negative  - will get GBS  - late to prenatal care  - Teen pregnancy  - will follow up MFM in 3 days for growth scan  - Follow up in 2 weeks    Labor precautions discussed, including: Regular painful contractions, lasting for greater than one hour, taking your breath away; any vaginal bleeding; any leakage of fluid; or absent or decreased fetal movement. Call M.D. on call if any of these symptoms or signs occur. I have discussed the diagnosis with the patient and the intended plan as seen in the above orders. The patient has received an after-visit summary and questions were answered concerning future plans. I have discussed medication side effects and warnings with the patient as well.     Patient discussed with Dr. Erika Chou MD  Family Medicine Resident [Bone Marrow Biopsy] : bone marrow biopsy [Bone Marrow Aspiration] : bone marrow aspiration [[email or web address]] : Neutropenia

## (undated) DEVICE — STERILE LATEX POWDER-FREE SURGICAL GLOVESWITH NITRILE COATING: Brand: PROTEXIS

## (undated) DEVICE — REM POLYHESIVE ADULT PATIENT RETURN ELECTRODE: Brand: VALLEYLAB

## (undated) DEVICE — TOWEL,OR,DSP,ST,BLUE,STD,2/PK,40PK/CS: Brand: MEDLINE

## (undated) DEVICE — SOLIDIFIER FLUID 3000 CC ABSORB

## (undated) DEVICE — C-SECTION II-LF: Brand: MEDLINE INDUSTRIES, INC.

## (undated) DEVICE — MASTISOL ADHESIVE LIQ 2/3ML

## (undated) DEVICE — 3000CC GUARDIAN II: Brand: GUARDIAN

## (undated) DEVICE — CATH FOLEY 16F LUBRI-SIL IC --

## (undated) DEVICE — PAD,ABDOMINAL,5"X9",ST,LF,25/BX: Brand: MEDLINE INDUSTRIES, INC.

## (undated) DEVICE — (D)PREP SKN CHLRAPRP APPL 26ML -- CONVERT TO ITEM 371833

## (undated) DEVICE — SOLUTION IV 1000ML 0.9% SOD CHL

## (undated) DEVICE — EXTRA LARGE, DISPOSABLE C-SECTION PROTECTOR - RETRACTOR: Brand: ALEXIS ® O C-SECTION PROTECTOR - RETRACTOR

## (undated) DEVICE — TRAY CATH OD16FR SIL URIN M STATLOK STBL DEV SURSTP

## (undated) DEVICE — SUTURE VCRL SZ 0 L36IN ABSRB VLT L40MM CT 1/2 CIR J358H

## (undated) DEVICE — 3M™ MEDIPORE™ H SOFT CLOTH SURGICAL TAPE, 2863, 3 IN X 10 YD, 12/CASE: Brand: 3M™ MEDIPORE™

## (undated) DEVICE — GARMENT,MEDLINE,DVT,INT,CALF,MED, GEN2: Brand: MEDLINE

## (undated) DEVICE — SUTURE PDS II SZ 1 L36IN ABSRB VLT CT L40MM 1/2 CIR TAPR Z359T

## (undated) DEVICE — STAPLER SKIN SQ 30 ABSRB STPL DISP INSORB

## (undated) DEVICE — LARGE, DISPOSABLE ALEXIS O C-SECTION PROTECTOR - RETRACTOR: Brand: ALEXIS ® O C-SECTION PROTECTOR - RETRACTOR

## (undated) DEVICE — BLADE ASSEMB CLP HAIR FINE --

## (undated) DEVICE — SYRINGE IRRIG 60ML SFT PLIABLE BLB EZ TO GRP 1 HND USE W/

## (undated) DEVICE — STRIP,CLOSURE,WOUND,MEDI-STRIP,1/2X4: Brand: MEDLINE

## (undated) DEVICE — TIP CLEANER: Brand: VALLEYLAB

## (undated) DEVICE — GOWN,AURORA,FABRIC-REINFORCED,X-LARGE: Brand: MEDLINE

## (undated) DEVICE — STERILE POLYISOPRENE POWDER-FREE SURGICAL GLOVES: Brand: PROTEXIS

## (undated) DEVICE — HANDLE LT SNAP ON ULT DURABLE LENS FOR TRUMPF ALC DISPOSABLE

## (undated) DEVICE — PREP SKN CHLRAPRP APL 26ML STR --

## (undated) DEVICE — ROCKER SWITCH PENCIL HOLSTER: Brand: VALLEYLAB